# Patient Record
Sex: FEMALE | Race: WHITE | NOT HISPANIC OR LATINO | URBAN - METROPOLITAN AREA
[De-identification: names, ages, dates, MRNs, and addresses within clinical notes are randomized per-mention and may not be internally consistent; named-entity substitution may affect disease eponyms.]

---

## 2018-11-07 ENCOUNTER — EMERGENCY (EMERGENCY)
Facility: HOSPITAL | Age: 58
LOS: 1 days | Discharge: HOME | End: 2018-11-07
Attending: EMERGENCY MEDICINE
Payer: COMMERCIAL

## 2018-11-07 VITALS
HEIGHT: 65.5 IN | HEART RATE: 117 BPM | DIASTOLIC BLOOD PRESSURE: 100 MMHG | TEMPERATURE: 98 F | WEIGHT: 164.91 LBS | SYSTOLIC BLOOD PRESSURE: 186 MMHG | RESPIRATION RATE: 26 BRPM | OXYGEN SATURATION: 100 %

## 2018-11-07 VITALS
OXYGEN SATURATION: 100 % | SYSTOLIC BLOOD PRESSURE: 169 MMHG | HEART RATE: 91 BPM | DIASTOLIC BLOOD PRESSURE: 102 MMHG | RESPIRATION RATE: 18 BRPM

## 2018-11-07 LAB
ALBUMIN SERPL ELPH-MCNC: 4.6 G/DL — SIGNIFICANT CHANGE UP (ref 3.3–5)
ALP SERPL-CCNC: 45 U/L — SIGNIFICANT CHANGE UP (ref 40–120)
ALT FLD-CCNC: 22 U/L — SIGNIFICANT CHANGE UP (ref 10–45)
ANION GAP SERPL CALC-SCNC: 15 MMOL/L — SIGNIFICANT CHANGE UP (ref 5–17)
ANION GAP SERPL CALC-SCNC: 21 MMOL/L — HIGH (ref 5–17)
APPEARANCE UR: CLEAR — SIGNIFICANT CHANGE UP
AST SERPL-CCNC: 32 U/L — SIGNIFICANT CHANGE UP (ref 10–40)
BACTERIA # UR AUTO: NEGATIVE — SIGNIFICANT CHANGE UP
BASOPHILS # BLD AUTO: 0 K/UL — SIGNIFICANT CHANGE UP (ref 0–0.2)
BASOPHILS NFR BLD AUTO: 0.2 % — SIGNIFICANT CHANGE UP (ref 0–2)
BILIRUB SERPL-MCNC: 0.7 MG/DL — SIGNIFICANT CHANGE UP (ref 0.2–1.2)
BILIRUB UR-MCNC: NEGATIVE — SIGNIFICANT CHANGE UP
BUN SERPL-MCNC: 16 MG/DL — SIGNIFICANT CHANGE UP (ref 7–23)
BUN SERPL-MCNC: 19 MG/DL — SIGNIFICANT CHANGE UP (ref 7–23)
CALCIUM SERPL-MCNC: 10.1 MG/DL — SIGNIFICANT CHANGE UP (ref 8.4–10.5)
CALCIUM SERPL-MCNC: 10.9 MG/DL — HIGH (ref 8.4–10.5)
CHLORIDE SERPL-SCNC: 104 MMOL/L — SIGNIFICANT CHANGE UP (ref 96–108)
CHLORIDE SERPL-SCNC: 96 MMOL/L — SIGNIFICANT CHANGE UP (ref 96–108)
CO2 SERPL-SCNC: 18 MMOL/L — LOW (ref 22–31)
CO2 SERPL-SCNC: 20 MMOL/L — LOW (ref 22–31)
COLOR SPEC: SIGNIFICANT CHANGE UP
CREAT SERPL-MCNC: 0.84 MG/DL — SIGNIFICANT CHANGE UP (ref 0.5–1.3)
CREAT SERPL-MCNC: 0.84 MG/DL — SIGNIFICANT CHANGE UP (ref 0.5–1.3)
DIFF PNL FLD: NEGATIVE — SIGNIFICANT CHANGE UP
EOSINOPHIL # BLD AUTO: 0 K/UL — SIGNIFICANT CHANGE UP (ref 0–0.5)
EOSINOPHIL NFR BLD AUTO: 0.5 % — SIGNIFICANT CHANGE UP (ref 0–6)
EPI CELLS # UR: 0 /HPF — SIGNIFICANT CHANGE UP
GAS PNL BLDV: SIGNIFICANT CHANGE UP
GLUCOSE SERPL-MCNC: 114 MG/DL — HIGH (ref 70–99)
GLUCOSE SERPL-MCNC: 135 MG/DL — HIGH (ref 70–99)
GLUCOSE UR QL: NEGATIVE — SIGNIFICANT CHANGE UP
HCT VFR BLD CALC: 39.2 % — SIGNIFICANT CHANGE UP (ref 34.5–45)
HGB BLD-MCNC: 13.8 G/DL — SIGNIFICANT CHANGE UP (ref 11.5–15.5)
HYALINE CASTS # UR AUTO: 0 /LPF — SIGNIFICANT CHANGE UP (ref 0–2)
KETONES UR-MCNC: ABNORMAL
LEUKOCYTE ESTERASE UR-ACNC: NEGATIVE — SIGNIFICANT CHANGE UP
LYMPHOCYTES # BLD AUTO: 1.1 K/UL — SIGNIFICANT CHANGE UP (ref 1–3.3)
LYMPHOCYTES # BLD AUTO: 14.2 % — SIGNIFICANT CHANGE UP (ref 13–44)
MCHC RBC-ENTMCNC: 31.3 PG — SIGNIFICANT CHANGE UP (ref 27–34)
MCHC RBC-ENTMCNC: 35 GM/DL — SIGNIFICANT CHANGE UP (ref 32–36)
MCV RBC AUTO: 89.3 FL — SIGNIFICANT CHANGE UP (ref 80–100)
MONOCYTES # BLD AUTO: 0.4 K/UL — SIGNIFICANT CHANGE UP (ref 0–0.9)
MONOCYTES NFR BLD AUTO: 5.1 % — SIGNIFICANT CHANGE UP (ref 2–14)
NEUTROPHILS # BLD AUTO: 6.4 K/UL — SIGNIFICANT CHANGE UP (ref 1.8–7.4)
NEUTROPHILS NFR BLD AUTO: 80.1 % — HIGH (ref 43–77)
NITRITE UR-MCNC: NEGATIVE — SIGNIFICANT CHANGE UP
PH UR: 6.5 — SIGNIFICANT CHANGE UP (ref 5–8)
PLATELET # BLD AUTO: 245 K/UL — SIGNIFICANT CHANGE UP (ref 150–400)
POTASSIUM SERPL-MCNC: 3.6 MMOL/L — SIGNIFICANT CHANGE UP (ref 3.5–5.3)
POTASSIUM SERPL-MCNC: 3.7 MMOL/L — SIGNIFICANT CHANGE UP (ref 3.5–5.3)
POTASSIUM SERPL-SCNC: 3.6 MMOL/L — SIGNIFICANT CHANGE UP (ref 3.5–5.3)
POTASSIUM SERPL-SCNC: 3.7 MMOL/L — SIGNIFICANT CHANGE UP (ref 3.5–5.3)
PROT SERPL-MCNC: 7.4 G/DL — SIGNIFICANT CHANGE UP (ref 6–8.3)
PROT UR-MCNC: SIGNIFICANT CHANGE UP
RBC # BLD: 4.39 M/UL — SIGNIFICANT CHANGE UP (ref 3.8–5.2)
RBC # FLD: 11.6 % — SIGNIFICANT CHANGE UP (ref 10.3–14.5)
RBC CASTS # UR COMP ASSIST: 1 /HPF — SIGNIFICANT CHANGE UP (ref 0–4)
SODIUM SERPL-SCNC: 135 MMOL/L — SIGNIFICANT CHANGE UP (ref 135–145)
SODIUM SERPL-SCNC: 139 MMOL/L — SIGNIFICANT CHANGE UP (ref 135–145)
SP GR SPEC: 1.02 — SIGNIFICANT CHANGE UP (ref 1.01–1.02)
TROPONIN T, HIGH SENSITIVITY RESULT: 8 NG/L — SIGNIFICANT CHANGE UP (ref 0–51)
TROPONIN T, HIGH SENSITIVITY RESULT: <6 NG/L — SIGNIFICANT CHANGE UP (ref 0–51)
TSH SERPL-MCNC: 2.7 UIU/ML — SIGNIFICANT CHANGE UP (ref 0.27–4.2)
UROBILINOGEN FLD QL: NEGATIVE — SIGNIFICANT CHANGE UP
WBC # BLD: 8 K/UL — SIGNIFICANT CHANGE UP (ref 3.8–10.5)
WBC # FLD AUTO: 8 K/UL — SIGNIFICANT CHANGE UP (ref 3.8–10.5)
WBC UR QL: 0 /HPF — SIGNIFICANT CHANGE UP (ref 0–5)

## 2018-11-07 PROCEDURE — 99285 EMERGENCY DEPT VISIT HI MDM: CPT | Mod: 25

## 2018-11-07 PROCEDURE — 82330 ASSAY OF CALCIUM: CPT

## 2018-11-07 PROCEDURE — 84484 ASSAY OF TROPONIN QUANT: CPT

## 2018-11-07 PROCEDURE — 84132 ASSAY OF SERUM POTASSIUM: CPT

## 2018-11-07 PROCEDURE — 85014 HEMATOCRIT: CPT

## 2018-11-07 PROCEDURE — 82550 ASSAY OF CK (CPK): CPT

## 2018-11-07 PROCEDURE — 85027 COMPLETE CBC AUTOMATED: CPT

## 2018-11-07 PROCEDURE — 83605 ASSAY OF LACTIC ACID: CPT

## 2018-11-07 PROCEDURE — 71046 X-RAY EXAM CHEST 2 VIEWS: CPT

## 2018-11-07 PROCEDURE — 80048 BASIC METABOLIC PNL TOTAL CA: CPT

## 2018-11-07 PROCEDURE — 93005 ELECTROCARDIOGRAM TRACING: CPT

## 2018-11-07 PROCEDURE — 84295 ASSAY OF SERUM SODIUM: CPT

## 2018-11-07 PROCEDURE — 71046 X-RAY EXAM CHEST 2 VIEWS: CPT | Mod: 26

## 2018-11-07 PROCEDURE — 99284 EMERGENCY DEPT VISIT MOD MDM: CPT | Mod: 25

## 2018-11-07 PROCEDURE — 36415 COLL VENOUS BLD VENIPUNCTURE: CPT

## 2018-11-07 PROCEDURE — 84443 ASSAY THYROID STIM HORMONE: CPT

## 2018-11-07 PROCEDURE — 81001 URINALYSIS AUTO W/SCOPE: CPT

## 2018-11-07 PROCEDURE — 82803 BLOOD GASES ANY COMBINATION: CPT

## 2018-11-07 PROCEDURE — 82435 ASSAY OF BLOOD CHLORIDE: CPT

## 2018-11-07 PROCEDURE — 82962 GLUCOSE BLOOD TEST: CPT

## 2018-11-07 PROCEDURE — 80053 COMPREHEN METABOLIC PANEL: CPT

## 2018-11-07 PROCEDURE — 82947 ASSAY GLUCOSE BLOOD QUANT: CPT

## 2018-11-07 PROCEDURE — 93010 ELECTROCARDIOGRAM REPORT: CPT

## 2018-11-07 RX ORDER — SODIUM CHLORIDE 9 MG/ML
1000 INJECTION INTRAMUSCULAR; INTRAVENOUS; SUBCUTANEOUS ONCE
Qty: 0 | Refills: 0 | Status: COMPLETED | OUTPATIENT
Start: 2018-11-07 | End: 2018-11-07

## 2018-11-07 RX ADMIN — SODIUM CHLORIDE 1000 MILLILITER(S): 9 INJECTION INTRAMUSCULAR; INTRAVENOUS; SUBCUTANEOUS at 17:34

## 2018-11-07 RX ADMIN — Medication 0.5 MILLIGRAM(S): at 19:16

## 2018-11-07 RX ADMIN — SODIUM CHLORIDE 1000 MILLILITER(S): 9 INJECTION INTRAMUSCULAR; INTRAVENOUS; SUBCUTANEOUS at 16:20

## 2018-11-07 NOTE — ED PROVIDER NOTE - PROGRESS NOTE DETAILS
Took signout on patient, well appearing, 2nd trop 8 so repeated 3rd time, also 8. Discharging pt to f/u with her PMD. Do not suspect emergent pathology at this time.

## 2018-11-07 NOTE — ED PROVIDER NOTE - NEUROLOGICAL, MLM
Patient called stating that she was suppose to use the Biest daily per [facility] but when patient uses the medication daily her breasts are very sore.  Patient uses the Biest every other day and then occasionally she may need to skip 2 days because her breasts are sore.  Patient states there is no blood in her urine and her urethra is not sore anymore.  Patient is wondering if it is okay the way she is using the medication.     Alert and oriented, no focal deficits, no motor or sensory deficits.

## 2018-11-07 NOTE — ED ADULT NURSE REASSESSMENT NOTE - NS ED NURSE REASSESS COMMENT FT1
Pt refusing blood work at this time. Family verbalizes concern of not having enough attention from doctors. Expresses desire to leave AMA. Dr Swift made aware. Awaiting further orders before proceeding. Pt refusing blood work at this time. Family verbalizes concern of not having enough attention from doctors and PAs. Expresses desire to leave AMA. TIO Burk made aware. Awaiting further orders before proceeding.

## 2018-11-07 NOTE — ED ADULT NURSE NOTE - NSIMPLEMENTINTERV_GEN_ALL_ED
Implemented All Universal Safety Interventions:  [place] to call system. Call bell, personal items and telephone within reach. Instruct patient to call for assistance. Room bathroom lighting operational. Non-slip footwear when patient is off stretcher. Physically safe environment: no spills, clutter or unnecessary equipment. Stretcher in lowest position, wheels locked, appropriate side rails in place.

## 2018-11-07 NOTE — ED PROVIDER NOTE - ATTENDING CONTRIBUTION TO CARE
57 yo F presents with episode of lightheadedness, shakiness, tingling in her bl hands and diaphoresis that started while she was sitting in the car. she got extremely anxious afterwards and started having palpitations and hyperventilating. she ate breakfast at 8am in the morning, and then worked out around 11am. 30 minutes after working out she sat in her car and had this episode. felt like she was hypoglycemic, so she took two bites of her sandwich with improvement of her symptoms. she drove home and then symptoms recurred slightly. so she drank a bottle of gatorade. she felt better after.   at this time patient feels much better. mild lightheadedness on sitting/standing up.   pt states that usually eats right before working out, and today she ate much earlier.   no headache, f/ch, uri symptoms, cp, sob, abd pain, N/V/D, urinary complaints, weakness/numbness  PE extremely anxious. AOX3, neuro intact, lungs CTA. abd soft and NT 59 yo F presents with episode of lightheadedness, shakiness, tingling in her bl hands and diaphoresis that started while she was sitting in the car. she got extremely anxious afterwards and started having palpitations and hyperventilating, and has been extremely anxious and hyperventilating ever since then (~2 hours). feels like she is having a panic attack. she ate breakfast at 8am in the morning, and then worked out around 11am. 30 minutes after working out she sat in her car and had this episode. felt like she was hypoglycemic, so she took two bites of her sandwich with improvement of her symptoms. she drove home and then symptoms recurred slightly. so she drank a bottle of gatorade. she felt better after.   at this time patient feels much better. mild lightheadedness on sitting/standing up.   pt states that usually eats right before working out, and today she ate much earlier.   no headache, f/ch, uri symptoms, cp, sob, abd pain, N/V/D, urinary complaints, weakness/numbness  PE extremely anxious. speaking and breathing rapidly. AOX3, neuro intact, lungs CTA. abd soft and NT. neuro intact.     patient verbally de-escalated on arrival. instructed to slow her breathing.   CXR with NAD. EKG with no acute changes. initial labs demonstrating a resp alkalosis, with pH 7.63. elevated lactate 4.4.   patient given IVFs int eh ER and allowed to relax. she eventually calmed down. repeat pH improved 7.58. lactate cleared. initial trop negative, --> repeat 8 --> delta trop 8, negative.   on patient re-eval she reported resolution of ALL her symptoms. she stated that she felt at baseline. denied any feelings of lightheadedness and tinglign. denies cp/sob. VS stable. patient has no PE risk factors, PERC negative. no e/o acs.   I suspect that initial symptoms related to hypoglycemic attack (since patient worked out several hours after eating) and her symptoms improved prior to arrival after eating/drinking. subsequent symptoms likely anxiety attack, which has resolved at this time. Patient was discharged with instructions to drink fluids, eat on a regular basis, and follow up with PMD in 1-2 days for repeat evaluation and further management.    The patient was discharged from the ED in stable condition. All results of today's workup were discussed with the patient and all questions/concerns were addressed. All discharge instructions were thoroughly discussed with the patient, as well as important warning signs and new/ worsening symptoms which should necessitate patient's immediate return to the ED. The patient is agreeable with discharge and expresses full understanding of all instructions given.

## 2018-11-07 NOTE — ED PROVIDER NOTE - CARE PLAN
Principal Discharge DX:	Anxiety Principal Discharge DX:	Lightheadedness  Secondary Diagnosis:	Anxiety  Secondary Diagnosis:	Alkalosis

## 2018-11-07 NOTE — ED PROVIDER NOTE - OBJECTIVE STATEMENT
59yo F with PMH hypothyroidism presenting with hyperventilation with assocated lightheadedness, HA, shaking, and tingling to b/l hands and feet x 2.5 hours. Pt  reports symptoms began after going to the gym today. Reports she woke up feeling well, ate breakfast, worked out with weights/cardio as she typically does, ate after gym, and symptoms began while in car approx 20 mins after the gym. Reports symptoms have persisted and only started feeling better in ED room. Pt reports HA has now resolved.  Never felt like this before. Of note, pt reports she "drinks too much" and admits to drinking "several" glasses of wine every night, and has been doing so for many years. Last drank wine last night. Has not tried to stop drinking before but would like to now. Denies any new meds/supplements/pre-workout, recent travel, hormone use, LE pain/swelling, fever/chills, recent illness, cough, CP, palpitations, abd pain, n/v, vision changes, weakness. 57yo F with PMH hypothyroidism presenting with hyperventilation with assocated lightheadedness, HA, body shaking, and tingling to b/l hands and feet x 2.5 hours. Pt  reports symptoms began after going to the gym today and have persisted since. Reports she woke up feeling well, ate breakfast, worked out with weights/cardio as she typically does, ate after gym, and symptoms began while in car approx 20 mins after the gym. Reports symptoms have persisted and only started feeling better in ED room. Pt reports HA has now resolved.  Never felt like this before. Of note, pt reports she "drinks too much" and admits to drinking "several" glasses of wine every night, and has been doing so for many years. Last drank wine last night. Has not tried to stop drinking before but would like to now. Denies any new meds/supplements/pre-workout, recent travel, hormone use, LE pain/swelling, fever/chills, recent illness, cough, CP, palpitations, abd pain, n/v/d, vision changes, weakness, urinary frequency/urgency, dysuria, h/o DM. 57yo F with PMH hypothyroidism presenting with hyperventilation with assocated lightheadedness, HA, body shaking, and tingling to b/l hands and feet x 2.5 hours. Pt  reports symptoms began after going to the gym today and have persisted since. Reports she woke up feeling well, ate breakfast, worked out with weights/cardio as she typically does, ate after gym, and symptoms began while in car approx 20 mins after the gym. Reports symptoms have persisted and only started feeling better in ED room. Pt reports HA has now resolved.  Never felt like this before. Of note, pt reports she "drinks too much" and admits to drinking "several" glasses of wine every night, and has been doing so for many years. Last drank wine last night. Has not tried to stop drinking before but would like to now. Former smoker. Denies any new meds/supplements/pre-workout, recent travel, hormone use, LE pain/swelling, fever/chills, recent illness, cough, CP, palpitations, abd pain, n/v/d, vision changes, weakness, urinary frequency/urgency, dysuria, h/o DM. 59yo F with PMH hypothyroidism presenting with hyperventilation with assocated lightheadedness, HA, body shaking, and tingling to b/l hands and feet x 2.5 hours. Pt  reports symptoms began after going to the gym today and have persisted since. Reports she woke up feeling well, ate breakfast, worked out with weights/cardio as she typically does, ate after gym, and symptoms began while in car approx 20 mins after the gym. Reports symptoms have persisted and only started feeling better in ED room. Pt reports HA has now resolved.  Never felt like this before. Of note, pt reports she "drinks too much" and admits to drinking "several" glasses of wine every night, and has been doing so for many years. Last drank wine last night. Has not tried to stop drinking before but would like to now. Former smoker. Denies any new meds/supplements/pre-workout, recent travel, hormone use, LE pain/swelling, fever/chills, recent illness, cough, CP, palpitations, abd pain, n/v/d, vision changes, weakness, urinary frequency/urgency, dysuria, h/o panic attacks, h/o DM. 59yo F with PMH hypothyroidism presenting with hyperventilation with assocated lightheadedness, HA, body shaking, and tingling to b/l hands and feet x 2.5 hours. Pt  reports symptoms began after going to the gym today and have persisted since. Reports she woke up feeling well, ate breakfast, worked out with weights/cardio as she typically does, and symptoms began while sitting in car approx 20 mins after the gym. Reports symptoms have persisted and only started feeling better in ED room. Pt reports HA has now resolved.  Never felt like this before. Of note, pt reports she "drinks too much" and admits to drinking "several" glasses of wine every night, and has been doing so for many years. Last drank wine last night. Has not tried to stop drinking before but would like to now. Former smoker. Denies any new meds/supplements/pre-workout, recent travel, hormone use, LE pain/swelling, fever/chills, recent illness, cough, CP, palpitations, abd pain, n/v/d, vision changes, weakness, urinary frequency/urgency, dysuria, h/o panic attacks, h/o DM.

## 2018-11-07 NOTE — ED ADULT NURSE NOTE - OBJECTIVE STATEMENT
59 y/o F pt w/ pmh of hypothyroidism, present to ED for lightheaded, HA, tingling to b/l hands and feet s/p hyperventilating, symptoms started 2 and half hour ago s/p working at the gym, pt was in her car when she started hyperventilating, became diaphoretic, lightheaded, tingling to b/l hands and feet, on exam pt A&Ox3, PERRL, neg facial droop, neg slurred speech, neg arm drift, equal b/l extremity strength, no vision changes, pt hypertensive 160's/100's, tachypneic rr 30's, denies weakness, numbness, tingling, N/V/D, chest pain, SOB, seen and eval by veronica MALONE placed, labs drawn and sent, EKG done and given to MD, CHICA NSR

## 2018-11-12 ENCOUNTER — LABORATORY RESULT (OUTPATIENT)
Age: 58
End: 2018-11-12

## 2018-11-13 ENCOUNTER — APPOINTMENT (OUTPATIENT)
Dept: CARDIOLOGY | Facility: CLINIC | Age: 58
End: 2018-11-13
Payer: COMMERCIAL

## 2018-11-13 ENCOUNTER — NON-APPOINTMENT (OUTPATIENT)
Age: 58
End: 2018-11-13

## 2018-11-13 ENCOUNTER — TRANSCRIPTION ENCOUNTER (OUTPATIENT)
Age: 58
End: 2018-11-13

## 2018-11-13 VITALS
HEART RATE: 74 BPM | SYSTOLIC BLOOD PRESSURE: 135 MMHG | RESPIRATION RATE: 15 BRPM | HEIGHT: 65 IN | DIASTOLIC BLOOD PRESSURE: 85 MMHG | WEIGHT: 165 LBS | BODY MASS INDEX: 27.49 KG/M2

## 2018-11-13 DIAGNOSIS — Z87.891 PERSONAL HISTORY OF NICOTINE DEPENDENCE: ICD-10-CM

## 2018-11-13 DIAGNOSIS — Z82.49 FAMILY HISTORY OF ISCHEMIC HEART DISEASE AND OTHER DISEASES OF THE CIRCULATORY SYSTEM: ICD-10-CM

## 2018-11-13 DIAGNOSIS — J06.9 ACUTE UPPER RESPIRATORY INFECTION, UNSPECIFIED: ICD-10-CM

## 2018-11-13 PROCEDURE — 93306 TTE W/DOPPLER COMPLETE: CPT

## 2018-11-13 PROCEDURE — 93000 ELECTROCARDIOGRAM COMPLETE: CPT | Mod: 59

## 2018-11-13 PROCEDURE — 99242 OFF/OP CONSLTJ NEW/EST SF 20: CPT | Mod: 25

## 2018-11-13 PROCEDURE — 93015 CV STRESS TEST SUPVJ I&R: CPT

## 2018-11-13 RX ORDER — LEVOTHYROXINE SODIUM 50 UG/1
50 TABLET ORAL DAILY
Refills: 0 | Status: ACTIVE | COMMUNITY
Start: 2018-11-13

## 2018-11-21 NOTE — PHYSICAL EXAM
[General Appearance - Well Developed] : well developed [Normal Appearance] : normal appearance [Well Groomed] : well groomed [General Appearance - Well Nourished] : well nourished [No Deformities] : no deformities [General Appearance - In No Acute Distress] : no acute distress [Normal Conjunctiva] : the conjunctiva exhibited no abnormalities [Normal Oral Mucosa] : normal oral mucosa [Normal Jugular Venous A Waves Present] : normal jugular venous A waves present [Normal Jugular Venous V Waves Present] : normal jugular venous V waves present [No Jugular Venous Estrada A Waves] : no jugular venous estrada A waves [5th Left ICS - MCL] : palpated at the 5th LICS in the midclavicular line [Normal] : normal [No Precordial Heave] : no precordial heave was noted [Normal Rate] : normal [Rhythm Regular] : regular [Normal S1] : normal S1 [Normal S2] : normal S2 [No Gallop] : no gallop heard [I] : a grade 1 [2+] : left 2+ [No Abnormalities] : the abdominal aorta was not enlarged and no bruit was heard [No Pitting Edema] : no pitting edema present [Respiration, Rhythm And Depth] : normal respiratory rhythm and effort [Exaggerated Use Of Accessory Muscles For Inspiration] : no accessory muscle use [Bowel Sounds] : normal bowel sounds [Abdomen Soft] : soft [Abnormal Walk] : normal gait [Gait - Sufficient For Exercise Testing] : the gait was sufficient for exercise testing [Nail Clubbing] : no clubbing of the fingernails [Cyanosis, Localized] : no localized cyanosis [Skin Color & Pigmentation] : normal skin color and pigmentation [Skin Turgor] : normal skin turgor [] : no rash [Oriented To Time, Place, And Person] : oriented to person, place, and time [Affect] : the affect was normal [Mood] : the mood was normal [No Anxiety] : not feeling anxious [S3] : no S3 [S4] : no S4 [Click] : no click [Distant] : the heart sounds were ~L not distant [Pericardial Rub] : no pericardial rub [Right Carotid Bruit] : no bruit heard over the right carotid [Left Carotid Bruit] : no bruit heard over the left carotid [Right Femoral Bruit] : no bruit heard over the right femoral artery [Left Femoral Bruit] : no bruit heard over the left femoral artery [FreeTextEntry1] : coarse breath sounds bilaterally

## 2018-11-21 NOTE — DISCUSSION/SUMMARY
[FreeTextEntry1] : This is a 58-year-old female past medical history significant for hypothyroidism, who comes in for cardiac consultation. The patient was well until approximately one week ago when after a vigorous workout with her , she developed a rapid heart rate, didn't feel well, felt particularly hungry. She went to get something to eat as she left the gym, and then sat in the car and felt that she was diaphoretic and shaky. She drank some Gatorade without improvement. She noted that her heart rate was running between  beats per minute on her watch. The patient had completed a vigorous workout on the elliptical  and heart rate went up to 160 beats per minute at that time. She then went to an urgent care center who did not do an electrocardiogram at that time but center to [facility] emergency room. Her blood pressure was noted to be 170/100+, and she was sent out for outpatient followup evaluation. The patient also gets occasional dyspnea on exertion.She was placed on Norvasc 5 mg a day by her primary care physician.\par Her cardiac risk factors include positive family support disease her mother had a myocardial infarction and subsequent coronary artery bypass surgery, her father  from myocardial infarction, and a brother recently  within the last 6 months but was diagnosed with atherosclerotic heart disease which was mild.\par Her cardiac risk factors include positive family history, history of smoking (she stopped over 30 years ago), and hypertension.\par New blood work will be done today for a lipid profile.\par The patient had a normal exercise stress test 2018. She had a mild elevated blood pressure response to exercise with peak blood pressure 175/90. She will also have an echo Doppler examination to rule out mitral valve prolapse, evaluate her left ventricular function, chamber size, and rule out hypertrophy.\par Electrocardiogram done 2018 demonstrated normal sinus rhythm at a rate of 74 beats per minute and is otherwise remarkable for poor R-wave progression.\par The patient's blood pressure is still above the normal acceptable limits, and I would recommend switching her Norvasc to Micardis 40 mg a day with an eye toward increasing that to 80 mg daily for better blood pressure control.\par The patient also has coarse breath sounds bilaterally without wheezing. She does lin has a productive cough. She will start on a Z-Ashish and will follow up with her primary care physician.\par Lifestyle modification including salt restriction was reinforced. The patient does exercise on regular basis.\par She is instructed to followup with her primary care physician. She'll followup with me in one month.\par I instructed her to increase her hydration during her aerobic workouts.  Her heart rate was most likely secondary to the effects of hypertension.\par She will followup with me in one month.\par

## 2018-11-21 NOTE — REASON FOR VISIT
[Consultation] : a consultation regarding [Hypertension] : hypertension [Palpitations] : palpitations [FreeTextEntry1] : murmur,

## 2018-12-10 ENCOUNTER — LABORATORY RESULT (OUTPATIENT)
Age: 58
End: 2018-12-10

## 2018-12-11 ENCOUNTER — APPOINTMENT (OUTPATIENT)
Dept: CARDIOLOGY | Facility: CLINIC | Age: 58
End: 2018-12-11
Payer: COMMERCIAL

## 2018-12-11 VITALS
HEART RATE: 66 BPM | RESPIRATION RATE: 15 BRPM | DIASTOLIC BLOOD PRESSURE: 78 MMHG | HEIGHT: 65 IN | BODY MASS INDEX: 25.99 KG/M2 | SYSTOLIC BLOOD PRESSURE: 123 MMHG | WEIGHT: 156 LBS

## 2018-12-11 PROCEDURE — 99213 OFFICE O/P EST LOW 20 MIN: CPT

## 2018-12-11 NOTE — DISCUSSION/SUMMARY
[FreeTextEntry1] : This is a 58-year-old female past medical history significant for hypothyroidism, who comes in for cardiac follow up cardiac evaluation.\par The patient's blood pressure is under excellent control. She is also changed her diet, she has reduced his salt intake. She will continue on the current medications. She will have blood work today to check a lipid panel.\par Further recommendations made at the results of the lipid are available for review. I will also review the results of her echo Doppler examination from 2018 which demonstrated minimal valvular regurgitation with satisfactory left ventricular function. She will followup with me in 4 months.\par The patient understands that aerobic exercises must be increased to 40 minutes 4 times per week. A detailed discussion of lifestyle modification was done today. The patient has a good understanding of the diagnosis, and treatment plan. Lifestyle modification was also outlined.\par \par \par \par The patient was well until approximately 2018-  when after a vigorous workout with her , she developed a rapid heart rate, didn't feel well, felt particularly hungry. She went to get something to eat as she left the gym, and then sat in the car and felt that she was diaphoretic and shaky. She drank some Gatorade without improvement. She noted that her heart rate was running between  beats per minute on her watch. The patient had completed a vigorous workout on the elliptical  and heart rate went up to 160 beats per minute at that time. She then went to an urgent care center who did not do an electrocardiogram at that time but center to [facility] emergency room. Her blood pressure was noted to be 170/100+, and she was sent out for outpatient followup evaluation. The patient also gets occasional dyspnea on exertion.She was placed on Norvasc 5 mg a day by her primary care physician.\par Her cardiac risk factors include positive family support disease her mother had a myocardial infarction and subsequent coronary artery bypass surgery, her father  from myocardial infarction, and a brother recently  within the last 6 months but was diagnosed with atherosclerotic heart disease which was mild.\par Her cardiac risk factors include positive family history, history of smoking (she stopped over 30 years ago), and hypertension.\par \par The patient had a normal exercise stress test 2018. She had a mild elevated blood pressure response to exercise with peak blood pressure 175/90. She will also have an echo Doppler examination to rule out mitral valve prolapse, evaluate her left ventricular function, chamber size, and rule out hypertrophy.\par Electrocardiogram done 2018 demonstrated normal sinus rhythm at a rate of 74 beats per minute and is otherwise remarkable for poor R-wave progression.\par Lifestyle modification including salt restriction was reinforced. The patient does exercise on regular basis.\par She is instructed to followup with her primary care physician. She'll followup with me in one month.\par I instructed her to increase her hydration during her aerobic workouts.  Her heart rate was most likely secondary to the effects of hypertension.\par She will followup with me in one month.\par

## 2018-12-11 NOTE — PHYSICAL EXAM
[FreeTextEntry1] : coarse breath sounds bilaterally [S3] : no S3 [S4] : no S4 [Click] : no click [Pericardial Rub] : no pericardial rub [Right Carotid Bruit] : no bruit heard over the right carotid [Left Carotid Bruit] : no bruit heard over the left carotid [Right Femoral Bruit] : no bruit heard over the right femoral artery [Left Femoral Bruit] : no bruit heard over the left femoral artery

## 2019-01-04 ENCOUNTER — RX RENEWAL (OUTPATIENT)
Age: 59
End: 2019-01-04

## 2019-01-08 ENCOUNTER — RX RENEWAL (OUTPATIENT)
Age: 59
End: 2019-01-08

## 2019-01-09 ENCOUNTER — TRANSCRIPTION ENCOUNTER (OUTPATIENT)
Age: 59
End: 2019-01-09

## 2019-01-10 ENCOUNTER — RX RENEWAL (OUTPATIENT)
Age: 59
End: 2019-01-10

## 2019-01-29 ENCOUNTER — TRANSCRIPTION ENCOUNTER (OUTPATIENT)
Age: 59
End: 2019-01-29

## 2019-01-29 ENCOUNTER — CLINICAL ADVICE (OUTPATIENT)
Age: 59
End: 2019-01-29

## 2019-03-06 ENCOUNTER — TRANSCRIPTION ENCOUNTER (OUTPATIENT)
Age: 59
End: 2019-03-06

## 2019-03-19 ENCOUNTER — NON-APPOINTMENT (OUTPATIENT)
Age: 59
End: 2019-03-19

## 2019-03-19 ENCOUNTER — APPOINTMENT (OUTPATIENT)
Dept: CARDIOLOGY | Facility: CLINIC | Age: 59
End: 2019-03-19
Payer: COMMERCIAL

## 2019-03-19 VITALS
RESPIRATION RATE: 15 BRPM | HEART RATE: 64 BPM | DIASTOLIC BLOOD PRESSURE: 79 MMHG | BODY MASS INDEX: 25.39 KG/M2 | WEIGHT: 158 LBS | SYSTOLIC BLOOD PRESSURE: 125 MMHG | HEIGHT: 66 IN

## 2019-03-19 PROCEDURE — 93000 ELECTROCARDIOGRAM COMPLETE: CPT

## 2019-03-19 PROCEDURE — 99213 OFFICE O/P EST LOW 20 MIN: CPT

## 2019-03-26 ENCOUNTER — LABORATORY RESULT (OUTPATIENT)
Age: 59
End: 2019-03-26

## 2019-04-02 ENCOUNTER — TRANSCRIPTION ENCOUNTER (OUTPATIENT)
Age: 59
End: 2019-04-02

## 2019-12-04 ENCOUNTER — RX RENEWAL (OUTPATIENT)
Age: 59
End: 2019-12-04

## 2020-02-04 ENCOUNTER — TRANSCRIPTION ENCOUNTER (OUTPATIENT)
Age: 60
End: 2020-02-04

## 2020-02-25 ENCOUNTER — NON-APPOINTMENT (OUTPATIENT)
Age: 60
End: 2020-02-25

## 2020-02-25 ENCOUNTER — APPOINTMENT (OUTPATIENT)
Dept: CARDIOLOGY | Facility: CLINIC | Age: 60
End: 2020-02-25
Payer: COMMERCIAL

## 2020-02-25 VITALS
SYSTOLIC BLOOD PRESSURE: 115 MMHG | HEIGHT: 66 IN | HEART RATE: 56 BPM | BODY MASS INDEX: 24.91 KG/M2 | WEIGHT: 155 LBS | DIASTOLIC BLOOD PRESSURE: 75 MMHG | RESPIRATION RATE: 15 BRPM

## 2020-02-25 DIAGNOSIS — Z87.891 PERSONAL HISTORY OF NICOTINE DEPENDENCE: ICD-10-CM

## 2020-02-25 PROCEDURE — 99214 OFFICE O/P EST MOD 30 MIN: CPT

## 2020-02-25 RX ORDER — AMLODIPINE BESYLATE 5 MG/1
5 TABLET ORAL
Refills: 0 | Status: DISCONTINUED | COMMUNITY
Start: 2018-11-13 | End: 2019-03-01

## 2020-02-26 RX ORDER — AZITHROMYCIN 250 MG/1
250 TABLET, FILM COATED ORAL
Qty: 6 | Refills: 0 | Status: COMPLETED | COMMUNITY
Start: 2018-11-13 | End: 2020-02-26

## 2020-04-25 ENCOUNTER — MESSAGE (OUTPATIENT)
Age: 60
End: 2020-04-25

## 2020-06-17 LAB
SARS-COV-2 IGG SERPL IA-ACNC: <3.8 AU/ML
SARS-COV-2 IGG SERPL QL IA: NEGATIVE

## 2020-12-16 PROBLEM — J06.9 UPPER RESPIRATORY INFECTION, ACUTE: Status: RESOLVED | Noted: 2018-11-13 | Resolved: 2020-12-16

## 2021-04-16 NOTE — ED ADULT NURSE NOTE - CAS EDN DISCHARGE ASSESSMENT
Awake/Alert and oriented to person, place and time
Call bell/Explanation of exam/test/Fall precautions/NPO

## 2021-07-11 ENCOUNTER — LABORATORY RESULT (OUTPATIENT)
Age: 61
End: 2021-07-11

## 2021-07-12 ENCOUNTER — NON-APPOINTMENT (OUTPATIENT)
Age: 61
End: 2021-07-12

## 2021-07-12 ENCOUNTER — APPOINTMENT (OUTPATIENT)
Dept: CARDIOLOGY | Facility: CLINIC | Age: 61
End: 2021-07-12
Payer: COMMERCIAL

## 2021-07-12 VITALS
SYSTOLIC BLOOD PRESSURE: 110 MMHG | WEIGHT: 156 LBS | HEART RATE: 82 BPM | BODY MASS INDEX: 25.07 KG/M2 | OXYGEN SATURATION: 97 % | RESPIRATION RATE: 16 BRPM | DIASTOLIC BLOOD PRESSURE: 74 MMHG | HEIGHT: 66 IN | TEMPERATURE: 97.7 F

## 2021-07-12 PROCEDURE — 93000 ELECTROCARDIOGRAM COMPLETE: CPT

## 2021-07-12 PROCEDURE — 99072 ADDL SUPL MATRL&STAF TM PHE: CPT

## 2021-07-12 PROCEDURE — 99214 OFFICE O/P EST MOD 30 MIN: CPT

## 2021-07-12 NOTE — ASSESSMENT
[FreeTextEntry1] : This is a 60 year year old female here today for follow up cardiac evaluation. \par She has a past medical history significant for  hypothyroidism, HTN and HLD.\par \par She is a retired ID nurse.\par \par CHIEF COMPLAINT: Regular follow up appointment.\par \par -Pt is stable from a cardiac standpoint and does not have any complaints at this time. She does admit to occasional LIZAMA when going up some stairs.\par \par -Her cardiac risk factors include positive family support disease her mother had a myocardial infarction and subsequent coronary artery bypass surgery, her father  from myocardial infarction, and a brother recently  within the last 6 months but was diagnosed with atherosclerotic heart disease which was mild. other cardiac risk factors include positive family history, history of smoking (she stopped over 30 years ago), and hypertension.\par \par BLOOD PRESSURE:\par -BP is well controlled in today's visit and patient is on Telmisartan 40mg PO DAILY.\par \par -I have discussed the importance of maintaining good BP control and reviewed the newest guidelines with the patient while re-enforcing dietary sodium restrictions to no more than 2-3 g daily, DASH diet, life style modifications as well as the goal of maintaining ideal body weight with the patient today. I have advised the patient to avoid the use of over-the-counter medications/ supplements especially NSAIDS.\par \par I have reviewed with Ms. FLOYD that serious health consequences can occur when blood pressure is not well controlled and the need for strict compliance with medication and that optimal control can significantly reduce the risk of cardiovascular disease stroke, heart attack and other organ damage. They verbally expressed understanding of the fore mentioned serious health consequences to me today.\par \par BLOOD WORK:\par -New blood work was done today, 2021 to evaluate lipid profile, CBC, BMP, hepatic function, A1C and TSH. Pt currently taking Rosuvastatin 20mg PO DAILY. \par \par CHOLESTEROL CONTROL:\par -Patient will continue the advised  TLC diet and to continue follow-up for treatment of hyperlipidemia and repeat blood testing with diet and exercise. I have discussed different exercises and the importance of maintenance of optimal body weight. The importance of staying within guidelines and recommendations was stressed to the patient today and they acknowledged that they understand this to me verbally.\par \par  -Ms. FLOYD was educated and advised that failure to follow-up with my medical recommendations to lower cholesterol can result in severe health consequences therefore, they will continuing a low saturated and low fat diet and to avoid excessive carbohydrates to help reduce triglycerides and that lowering LDL levels is associated with a significant decrease in serious cardiac events including but not limited to heart attack stroke and overall death. We will continue lipid lowering agents as advised based on blood test results and the patient understands to call if they develop severe muscle discomfort or if they have a reddish tinted discoloration in their urine.\par \par TESTING/REPORTS:\par -EKG done 2021 which demonstrated regular sinus rhythm with nonspecific ST-T wave changes, BPM of 61\par \par -Echo Doppler examination from 2018 which demonstrated minimal valvular regurgitation with satisfactory left ventricular function. She will followup with me in 4 months.\par \par -The patient had a normal exercise stress test 2018. \par \par PLAN:\par -She will continue with her usual medications and will contact the office if she is having any complaints between now and their next follow up appointment.\par -The patient will schedule an Exercise Stress Test rule out significant coronary artery disease and will schedule \par an Echo Doppler examination to evaluate murmur, left ventricular function, chamber size, and rule out hypertrophy.\par \par I have discussed the plan of care with Ms. CASTILLO FLOYD  and she  will follow up in 6 months. She is compliant with all of her medications.\par \par The patient understands that aerobic exercises must be increased to minutes 4 times/week and a detailed discussion of lifestyle modification was done today. \par The patient has a good understanding of the diagnosis, treatment plan and lifestyle modification. \par She will contact me at the office for any questions with their care or any changes in their health status.\par \par The patient was seen together with supervision physician Dr. Max Chase and the plan of care was discussed with the patient and will be carried out as noted above.\par \par Poly BLAND

## 2021-07-12 NOTE — PHYSICAL EXAM
[Well Developed] : well developed [Well Nourished] : well nourished [No Acute Distress] : no acute distress [Normal Venous Pressure] : normal venous pressure [No Carotid Bruit] : no carotid bruit [Normal S1, S2] : normal S1, S2 [No Murmur] : no murmur [No Rub] : no rub [Clear Lung Fields] : clear lung fields [Good Air Entry] : good air entry [No Respiratory Distress] : no respiratory distress  [Soft] : abdomen soft [Non Tender] : non-tender [No Masses/organomegaly] : no masses/organomegaly [Normal Bowel Sounds] : normal bowel sounds [Normal Gait] : normal gait [No Edema] : no edema [No Cyanosis] : no cyanosis [No Clubbing] : no clubbing [No Varicosities] : no varicosities [No Rash] : no rash [No Skin Lesions] : no skin lesions [Moves all extremities] : moves all extremities [No Focal Deficits] : no focal deficits [Normal Speech] : normal speech [Alert and Oriented] : alert and oriented [Normal memory] : normal memory [General Appearance - Well Developed] : well developed [Normal Appearance] : normal appearance [Well Groomed] : well groomed [General Appearance - Well Nourished] : well nourished [No Deformities] : no deformities [General Appearance - In No Acute Distress] : no acute distress [Normal Conjunctiva] : the conjunctiva exhibited no abnormalities [Normal Oral Mucosa] : normal oral mucosa [Normal Jugular Venous A Waves Present] : normal jugular venous A waves present [Normal Jugular Venous V Waves Present] : normal jugular venous V waves present [No Jugular Venous Estrada A Waves] : no jugular venous estrada A waves [Respiration, Rhythm And Depth] : normal respiratory rhythm and effort [Exaggerated Use Of Accessory Muscles For Inspiration] : no accessory muscle use [Bowel Sounds] : normal bowel sounds [Abdomen Soft] : soft [Abnormal Walk] : normal gait [Gait - Sufficient For Exercise Testing] : the gait was sufficient for exercise testing [Nail Clubbing] : no clubbing of the fingernails [Cyanosis, Localized] : no localized cyanosis [Skin Color & Pigmentation] : normal skin color and pigmentation [Skin Turgor] : normal skin turgor [] : no rash [Oriented To Time, Place, And Person] : oriented to person, place, and time [Affect] : the affect was normal [Mood] : the mood was normal [No Anxiety] : not feeling anxious [5th Left ICS - MCL] : palpated at the 5th LICS in the midclavicular line [Normal] : normal [No Precordial Heave] : no precordial heave was noted [Bradycardia] : bradycardic [Heart Rate ___] : [unfilled] bpm [Rhythm Regular] : regular [Normal S1] : normal S1 [Normal S2] : normal S2 [No Gallop] : no gallop heard [I] : a grade 1 [2+] : left 2+ [No Abnormalities] : the abdominal aorta was not enlarged and no bruit was heard [No Pitting Edema] : no pitting edema present [FreeTextEntry1] : coarse breath sounds bilaterally [S3] : no S3 [S4] : no S4 [Click] : no click [Pericardial Rub] : no pericardial rub [Right Carotid Bruit] : no bruit heard over the right carotid [Left Carotid Bruit] : no bruit heard over the left carotid

## 2021-07-12 NOTE — DISCUSSION/SUMMARY
[FreeTextEntry1] : Dr. Chase-(PRIOR VISIT and PMH WITH Dr. Chase): \par This is a 58-year-old female past medical history significant for hypothyroidism, who comes in for cardiac follow up cardiac evaluation.She denies chest pain, shortness of breath, dizziness or syncope.  The patient has been stable on her current medications.\par \par Patient has been complaining of leg cramping on statin therapy.  She self changed her medication to Crestor every other day.  She feels better.  Her cramping was only at nighttime.\par \par The patient will increase her hydration and try Pedialyte.  She will also try coenzyme Q 10.  She will continue every other days Crestor therapy for now.  New blood work was done today.  She will follow-up with me in 2 months.\par \par Her blood pressure under excellent control.\par \par Electrocardiogram done 2019 demonstrated normal sinus rhythm rate 74 beats per minutes otherwise unremarkable.\par \par Echo Doppler examination from 2018 which demonstrated minimal valvular regurgitation with satisfactory left ventricular function. She will followup with me in 4 months.\par \par The patient understands that aerobic exercises must be increased to 40 minutes 4 times per week. A detailed discussion of lifestyle modification was done today. The patient has a good understanding of the diagnosis, and treatment plan. Lifestyle modification was also outlined.\par \par PMH:\par  2018-  when after a vigorous workout with her , she developed a rapid heart rate, didn't feel well, felt particularly hungry. She went to get something to eat as she left the gym, and then sat in the car and felt that she was diaphoretic and shaky. She drank some Gatorade without improvement. She noted that her heart rate was running between  beats per minute on her watch. The patient had completed a vigorous workout on the elliptical  and heart rate went up to 160 beats per minute at that time. She then went to an urgent care center who did not do an electrocardiogram at that time but center to [facility] emergency room. Her blood pressure was noted to be 170/100+, and she was sent out for outpatient followup evaluation. The patient also gets occasional dyspnea on exertion.She was placed on Norvasc 5 mg a day by her primary care physician.\par \par Her cardiac risk factors include positive family support disease her mother had a myocardial infarction and subsequent coronary artery bypass surgery, her father  from myocardial infarction, and a brother recently  within the last 6 months but was diagnosed with atherosclerotic heart disease which was mild. other cardiac risk factors include positive family history, history of smoking (she stopped over 30 years ago), and hypertension.\par \par The patient had a normal exercise stress test 2018. \par \par Lifestyle modification including salt restriction was reinforced. The patient does exercise on regular basis.\par She is instructed to followup with her primary care physician. She'll followup with me in one month.\par I instructed her to increase her hydration during her aerobic workouts.  Her heart rate was most likely secondary to the effects of hypertension.\par She will followup with me in one month.\par

## 2021-07-12 NOTE — REASON FOR VISIT
[Follow-Up - Clinic] : a clinic follow-up of [Palpitations] : palpitations [Hyperlipidemia] : hyperlipidemia [Hypertension] : hypertension [FreeTextEntry1] : murmur

## 2021-07-12 NOTE — CARDIOLOGY SUMMARY
[___] : [unfilled] [No Ischemia] : no Ischemia [LVEF ___%] : LVEF [unfilled]% [de-identified] : 7/12/2021 [de-identified] : 11/13/2018 [de-identified] : 1/13/2018

## 2021-07-13 ENCOUNTER — TRANSCRIPTION ENCOUNTER (OUTPATIENT)
Age: 61
End: 2021-07-13

## 2022-01-07 ENCOUNTER — NON-APPOINTMENT (OUTPATIENT)
Age: 62
End: 2022-01-07

## 2022-01-10 ENCOUNTER — LABORATORY RESULT (OUTPATIENT)
Age: 62
End: 2022-01-10

## 2022-01-10 ENCOUNTER — APPOINTMENT (OUTPATIENT)
Dept: CARDIOLOGY | Facility: CLINIC | Age: 62
End: 2022-01-10
Payer: COMMERCIAL

## 2022-01-10 VITALS
BODY MASS INDEX: 25.39 KG/M2 | DIASTOLIC BLOOD PRESSURE: 79 MMHG | OXYGEN SATURATION: 99 % | HEIGHT: 66 IN | SYSTOLIC BLOOD PRESSURE: 122 MMHG | HEART RATE: 78 BPM | TEMPERATURE: 98 F | RESPIRATION RATE: 15 BRPM | WEIGHT: 158 LBS

## 2022-01-10 DIAGNOSIS — R06.00 DYSPNEA, UNSPECIFIED: ICD-10-CM

## 2022-01-10 DIAGNOSIS — E03.9 HYPOTHYROIDISM, UNSPECIFIED: ICD-10-CM

## 2022-01-10 PROCEDURE — ZZZZZ: CPT

## 2022-01-10 PROCEDURE — 99213 OFFICE O/P EST LOW 20 MIN: CPT | Mod: 25

## 2022-01-10 PROCEDURE — 93015 CV STRESS TEST SUPVJ I&R: CPT

## 2022-01-10 PROCEDURE — 93306 TTE W/DOPPLER COMPLETE: CPT

## 2022-01-10 NOTE — REASON FOR VISIT
[Hyperlipidemia] : hyperlipidemia [Follow-Up - Clinic] : a clinic follow-up of [Hypertension] : hypertension [Palpitations] : palpitations [CV Risk Factors and Non-Cardiac Disease] : CV risk factors and non-cardiac disease [FreeTextEntry1] : murmur

## 2022-01-10 NOTE — ASSESSMENT
[FreeTextEntry1] : Prior note nurse practitioner [facility] 2021::\par \par This is a 60 year year old female here today for follow up cardiac evaluation. \par She has a past medical history significant for  hypothyroidism, HTN and HLD.\par \par She is a retired ID nurse.\par \par CHIEF COMPLAINT: Regular follow up appointment.\par \par -Pt is stable from a cardiac standpoint and does not have any complaints at this time. She does admit to occasional LIZAMA when going up some stairs.\par \par -Her cardiac risk factors include positive family support disease her mother had a myocardial infarction and subsequent coronary artery bypass surgery, her father  from myocardial infarction, and a brother recently  within the last 6 months but was diagnosed with atherosclerotic heart disease which was mild. other cardiac risk factors include positive family history, history of smoking (she stopped over 30 years ago), and hypertension.\par \par BLOOD PRESSURE:\par -BP is well controlled in today's visit and patient is on Telmisartan 40mg PO DAILY.\par \par -I have discussed the importance of maintaining good BP control and reviewed the newest guidelines with the patient while re-enforcing dietary sodium restrictions to no more than 2-3 g daily, DASH diet, life style modifications as well as the goal of maintaining ideal body weight with the patient today. I have advised the patient to avoid the use of over-the-counter medications/ supplements especially NSAIDS.\par \par I have reviewed with Ms. FLOYD that serious health consequences can occur when blood pressure is not well controlled and the need for strict compliance with medication and that optimal control can significantly reduce the risk of cardiovascular disease stroke, heart attack and other organ damage. They verbally expressed understanding of the fore mentioned serious health consequences to me today.\par \par BLOOD WORK:\par -New blood work was done today, 2021 to evaluate lipid profile, CBC, BMP, hepatic function, A1C and TSH. Pt currently taking Rosuvastatin 20mg PO DAILY. \par \par CHOLESTEROL CONTROL:\par -Patient will continue the advised  TLC diet and to continue follow-up for treatment of hyperlipidemia and repeat blood testing with diet and exercise. I have discussed different exercises and the importance of maintenance of optimal body weight. The importance of staying within guidelines and recommendations was stressed to the patient today and they acknowledged that they understand this to me verbally.\par \par  -Ms. FLOYD was educated and advised that failure to follow-up with my medical recommendations to lower cholesterol can result in severe health consequences therefore, they will continuing a low saturated and low fat diet and to avoid excessive carbohydrates to help reduce triglycerides and that lowering LDL levels is associated with a significant decrease in serious cardiac events including but not limited to heart attack stroke and overall death. We will continue lipid lowering agents as advised based on blood test results and the patient understands to call if they develop severe muscle discomfort or if they have a reddish tinted discoloration in their urine.\par \par TESTING/REPORTS:\par -EKG done 2021 which demonstrated regular sinus rhythm with nonspecific ST-T wave changes, BPM of 61\par \par -Echo Doppler examination from 2018 which demonstrated minimal valvular regurgitation with satisfactory left ventricular function. She will followup with me in 4 months.\par \par -The patient had a normal exercise stress test 2018. \par \par PLAN:\par -She will continue with her usual medications and will contact the office if she is having any complaints between now and their next follow up appointment.\par -The patient will schedule an Exercise Stress Test rule out significant coronary artery disease and will schedule \par an Echo Doppler examination to evaluate murmur, left ventricular function, chamber size, and rule out hypertrophy.\par \par I have discussed the plan of care with Ms. CASTILLO FLOYD  and she  will follow up in 6 months. She is compliant with all of her medications.\par \par The patient understands that aerobic exercises must be increased to minutes 4 times/week and a detailed discussion of lifestyle modification was done today. \par The patient has a good understanding of the diagnosis, treatment plan and lifestyle modification. \par She will contact me at the office for any questions with their care or any changes in their health status.\par \par The patient was seen together with supervision physician Dr. Max Chase and the plan of care was discussed with the patient and will be carried out as noted above.\par \par Poly BLAND

## 2022-01-10 NOTE — DISCUSSION/SUMMARY
[FreeTextEntry1] : This is a 61-year-old female past medical history significant for status post COVID-19 infection 2021, hypothyroidism, who comes in for cardiac follow up cardiac evaluation.She denies chest pain, shortness of breath, dizziness or syncope.\par Her cardiac risk factors include positive family support disease her mother had a myocardial infarction and subsequent coronary artery bypass surgery, her father  from myocardial infarction, and a brother recently  within the last 6 months but was diagnosed with atherosclerotic heart disease which was mild. other cardiac risk factors include positive family history, history of smoking (she stopped over 30 years ago), and hypertension. \par The patient has been stable on her current medications.\par The patient had a normal exercise stress test January 10, 2022.\par The patient will have new blood work done today for lipid panel electrolytes.\par She has been stable on her current dose of Micardis 40 mg daily and Crestor 20 mg/day.  She exercises on a regular basis.  She does a [employer] workouts.\par Electrocardiogram done 2019 demonstrated normal sinus rhythm rate 74 beats per minutes otherwise unremarkable.\par \par Echo Doppler examination from 2018 which demonstrated minimal valvular regurgitation with satisfactory left ventricular function. She will followup with me in 4 months.\par \par The patient understands that aerobic exercises must be increased to 40 minutes 4 times per week. A detailed discussion of lifestyle modification was done today. The patient has a good understanding of the diagnosis, and treatment plan. Lifestyle modification was also outlined.\par \par PMH:\par  2018-  when after a vigorous workout with her , she developed a rapid heart rate, didn't feel well, felt particularly hungry. She went to get something to eat as she left the gym, and then sat in the car and felt that she was diaphoretic and shaky. She drank some Gatorade without improvement. She noted that her heart rate was running between  beats per minute on her watch. The patient had completed a vigorous workout on the elliptical  and heart rate went up to 160 beats per minute at that time. She then went to an urgent care center who did not do an electrocardiogram at that time but center to [facility] emergency room. Her blood pressure was noted to be 170/100+, and she was sent out for outpatient followup evaluation. The patient also gets occasional dyspnea on exertion.She was placed on Norvasc 5 mg a day by her primary care physician.\par \par Lifestyle modification including salt restriction was reinforced.\par

## 2022-01-11 RX ORDER — BLOOD SUGAR DIAGNOSTIC
100 STRIP MISCELLANEOUS DAILY
Refills: 0 | Status: ACTIVE | COMMUNITY
Start: 2022-01-11

## 2022-01-11 RX ORDER — MULTIVITAMIN
TABLET ORAL DAILY
Refills: 0 | Status: ACTIVE | COMMUNITY
Start: 2022-01-11

## 2022-01-21 PROBLEM — R06.00 DYSPNEA ON EXERTION: Status: ACTIVE | Noted: 2018-11-13

## 2022-02-19 ENCOUNTER — APPOINTMENT (OUTPATIENT)
Dept: ULTRASOUND IMAGING | Facility: CLINIC | Age: 62
End: 2022-02-19
Payer: COMMERCIAL

## 2022-02-19 ENCOUNTER — OUTPATIENT (OUTPATIENT)
Dept: OUTPATIENT SERVICES | Facility: HOSPITAL | Age: 62
LOS: 1 days | End: 2022-02-19
Payer: COMMERCIAL

## 2022-02-19 DIAGNOSIS — N95.0 POSTMENOPAUSAL BLEEDING: ICD-10-CM

## 2022-02-19 PROCEDURE — 76830 TRANSVAGINAL US NON-OB: CPT

## 2022-02-19 PROCEDURE — 76856 US EXAM PELVIC COMPLETE: CPT | Mod: 26

## 2022-02-19 PROCEDURE — 76856 US EXAM PELVIC COMPLETE: CPT

## 2022-02-19 PROCEDURE — 76830 TRANSVAGINAL US NON-OB: CPT | Mod: 26

## 2022-02-21 ENCOUNTER — RESULT REVIEW (OUTPATIENT)
Age: 62
End: 2022-02-21

## 2022-02-24 ENCOUNTER — APPOINTMENT (OUTPATIENT)
Dept: MAMMOGRAPHY | Facility: IMAGING CENTER | Age: 62
End: 2022-02-24
Payer: COMMERCIAL

## 2022-02-24 ENCOUNTER — OUTPATIENT (OUTPATIENT)
Dept: OUTPATIENT SERVICES | Facility: HOSPITAL | Age: 62
LOS: 1 days | End: 2022-02-24
Payer: COMMERCIAL

## 2022-02-24 ENCOUNTER — APPOINTMENT (OUTPATIENT)
Dept: RADIOLOGY | Facility: IMAGING CENTER | Age: 62
End: 2022-02-24
Payer: COMMERCIAL

## 2022-02-24 DIAGNOSIS — Z00.8 ENCOUNTER FOR OTHER GENERAL EXAMINATION: ICD-10-CM

## 2022-02-24 PROCEDURE — 77067 SCR MAMMO BI INCL CAD: CPT | Mod: 26

## 2022-02-24 PROCEDURE — 76641 ULTRASOUND BREAST COMPLETE: CPT | Mod: 26,50

## 2022-02-24 PROCEDURE — 76641 ULTRASOUND BREAST COMPLETE: CPT

## 2022-02-24 PROCEDURE — 77063 BREAST TOMOSYNTHESIS BI: CPT | Mod: 26

## 2022-02-24 PROCEDURE — 77080 DXA BONE DENSITY AXIAL: CPT

## 2022-02-24 PROCEDURE — 77063 BREAST TOMOSYNTHESIS BI: CPT

## 2022-02-24 PROCEDURE — 77080 DXA BONE DENSITY AXIAL: CPT | Mod: 26

## 2022-02-24 PROCEDURE — 77067 SCR MAMMO BI INCL CAD: CPT

## 2022-03-04 ENCOUNTER — RESULT REVIEW (OUTPATIENT)
Age: 62
End: 2022-03-04

## 2022-03-04 ENCOUNTER — APPOINTMENT (OUTPATIENT)
Dept: GYNECOLOGIC ONCOLOGY | Facility: CLINIC | Age: 62
End: 2022-03-04
Payer: COMMERCIAL

## 2022-03-04 ENCOUNTER — OUTPATIENT (OUTPATIENT)
Dept: OUTPATIENT SERVICES | Facility: HOSPITAL | Age: 62
LOS: 1 days | Discharge: HOME | End: 2022-03-04

## 2022-03-04 VITALS
SYSTOLIC BLOOD PRESSURE: 142 MMHG | DIASTOLIC BLOOD PRESSURE: 90 MMHG | BODY MASS INDEX: 25.55 KG/M2 | WEIGHT: 159 LBS | HEART RATE: 85 BPM | HEIGHT: 66 IN

## 2022-03-04 DIAGNOSIS — C54.1 MALIGNANT NEOPLASM OF ENDOMETRIUM: ICD-10-CM

## 2022-03-04 PROCEDURE — 99204 OFFICE O/P NEW MOD 45 MIN: CPT

## 2022-03-11 ENCOUNTER — APPOINTMENT (OUTPATIENT)
Dept: CT IMAGING | Facility: IMAGING CENTER | Age: 62
End: 2022-03-11
Payer: COMMERCIAL

## 2022-03-11 ENCOUNTER — OUTPATIENT (OUTPATIENT)
Dept: OUTPATIENT SERVICES | Facility: HOSPITAL | Age: 62
LOS: 1 days | End: 2022-03-11
Payer: COMMERCIAL

## 2022-03-11 DIAGNOSIS — C55 MALIGNANT NEOPLASM OF UTERUS, PART UNSPECIFIED: ICD-10-CM

## 2022-03-11 PROCEDURE — 82565 ASSAY OF CREATININE: CPT

## 2022-03-11 PROCEDURE — 74177 CT ABD & PELVIS W/CONTRAST: CPT | Mod: 26

## 2022-03-11 PROCEDURE — 74177 CT ABD & PELVIS W/CONTRAST: CPT

## 2022-03-21 ENCOUNTER — OUTPATIENT (OUTPATIENT)
Dept: OUTPATIENT SERVICES | Facility: HOSPITAL | Age: 62
LOS: 1 days | End: 2022-03-21
Payer: COMMERCIAL

## 2022-03-21 VITALS
HEIGHT: 65.5 IN | HEART RATE: 72 BPM | DIASTOLIC BLOOD PRESSURE: 70 MMHG | OXYGEN SATURATION: 99 % | SYSTOLIC BLOOD PRESSURE: 100 MMHG | TEMPERATURE: 97 F | RESPIRATION RATE: 18 BRPM | WEIGHT: 160.06 LBS

## 2022-03-21 DIAGNOSIS — C55 MALIGNANT NEOPLASM OF UTERUS, PART UNSPECIFIED: ICD-10-CM

## 2022-03-21 DIAGNOSIS — E03.9 HYPOTHYROIDISM, UNSPECIFIED: ICD-10-CM

## 2022-03-21 DIAGNOSIS — I10 ESSENTIAL (PRIMARY) HYPERTENSION: ICD-10-CM

## 2022-03-21 DIAGNOSIS — H26.9 UNSPECIFIED CATARACT: Chronic | ICD-10-CM

## 2022-03-21 DIAGNOSIS — Z01.812 ENCOUNTER FOR PREPROCEDURAL LABORATORY EXAMINATION: ICD-10-CM

## 2022-03-21 DIAGNOSIS — Z98.890 OTHER SPECIFIED POSTPROCEDURAL STATES: Chronic | ICD-10-CM

## 2022-03-21 LAB
A1C WITH ESTIMATED AVERAGE GLUCOSE RESULT: 5.4 % — SIGNIFICANT CHANGE UP (ref 4–5.6)
ALBUMIN SERPL ELPH-MCNC: 4.6 G/DL — SIGNIFICANT CHANGE UP (ref 3.3–5)
ALP SERPL-CCNC: 40 U/L — SIGNIFICANT CHANGE UP (ref 40–120)
ALT FLD-CCNC: 23 U/L — SIGNIFICANT CHANGE UP (ref 4–33)
ANION GAP SERPL CALC-SCNC: 13 MMOL/L — SIGNIFICANT CHANGE UP (ref 7–14)
AST SERPL-CCNC: 29 U/L — SIGNIFICANT CHANGE UP (ref 4–32)
BILIRUB SERPL-MCNC: 0.8 MG/DL — SIGNIFICANT CHANGE UP (ref 0.2–1.2)
BLD GP AB SCN SERPL QL: NEGATIVE — SIGNIFICANT CHANGE UP
BUN SERPL-MCNC: 20 MG/DL — SIGNIFICANT CHANGE UP (ref 7–23)
CALCIUM SERPL-MCNC: 10.2 MG/DL — SIGNIFICANT CHANGE UP (ref 8.4–10.5)
CHLORIDE SERPL-SCNC: 104 MMOL/L — SIGNIFICANT CHANGE UP (ref 98–107)
CO2 SERPL-SCNC: 24 MMOL/L — SIGNIFICANT CHANGE UP (ref 22–31)
CREAT SERPL-MCNC: 0.81 MG/DL — SIGNIFICANT CHANGE UP (ref 0.5–1.3)
EGFR: 82 ML/MIN/1.73M2 — SIGNIFICANT CHANGE UP
ESTIMATED AVERAGE GLUCOSE: 108 — SIGNIFICANT CHANGE UP
GLUCOSE SERPL-MCNC: 103 MG/DL — HIGH (ref 70–99)
HCT VFR BLD CALC: 39.8 % — SIGNIFICANT CHANGE UP (ref 34.5–45)
HGB BLD-MCNC: 12.8 G/DL — SIGNIFICANT CHANGE UP (ref 11.5–15.5)
MCHC RBC-ENTMCNC: 30.2 PG — SIGNIFICANT CHANGE UP (ref 27–34)
MCHC RBC-ENTMCNC: 32.2 GM/DL — SIGNIFICANT CHANGE UP (ref 32–36)
MCV RBC AUTO: 93.9 FL — SIGNIFICANT CHANGE UP (ref 80–100)
NRBC # BLD: 0 /100 WBCS — SIGNIFICANT CHANGE UP
NRBC # FLD: 0 K/UL — SIGNIFICANT CHANGE UP
PLATELET # BLD AUTO: 226 K/UL — SIGNIFICANT CHANGE UP (ref 150–400)
POTASSIUM SERPL-MCNC: 4.1 MMOL/L — SIGNIFICANT CHANGE UP (ref 3.5–5.3)
POTASSIUM SERPL-SCNC: 4.1 MMOL/L — SIGNIFICANT CHANGE UP (ref 3.5–5.3)
PROT SERPL-MCNC: 7.2 G/DL — SIGNIFICANT CHANGE UP (ref 6–8.3)
RBC # BLD: 4.24 M/UL — SIGNIFICANT CHANGE UP (ref 3.8–5.2)
RBC # FLD: 11.8 % — SIGNIFICANT CHANGE UP (ref 10.3–14.5)
RH IG SCN BLD-IMP: POSITIVE — SIGNIFICANT CHANGE UP
SODIUM SERPL-SCNC: 141 MMOL/L — SIGNIFICANT CHANGE UP (ref 135–145)
WBC # BLD: 5 K/UL — SIGNIFICANT CHANGE UP (ref 3.8–10.5)
WBC # FLD AUTO: 5 K/UL — SIGNIFICANT CHANGE UP (ref 3.8–10.5)

## 2022-03-21 PROCEDURE — 93010 ELECTROCARDIOGRAM REPORT: CPT

## 2022-03-21 NOTE — H&P PST ADULT - NSICDXPASTMEDICALHX_GEN_ALL_CORE_FT
PAST MEDICAL HISTORY:  Dyslipidemia     Hypertension     Hypothyroidism     Malignant neoplasm of uterus, part unspecified      PAST MEDICAL HISTORY:  COVID-19 virus infection h/o 11/2021    Dyslipidemia     Hypertension     Hypothyroidism     Malignant neoplasm of uterus, part unspecified

## 2022-03-21 NOTE — H&P PST ADULT - PROBLEM SELECTOR PLAN 1
Pt instructed to take telmisartan on morning of surgery. Pt instructed to take telmisartan on morning of surgery.  copy of recent echo & stress test report in chart .

## 2022-03-21 NOTE — H&P PST ADULT - PROBLEM SELECTOR PLAN 2
Written & verbal preop instructions, gi prophylaxis & surgical soap given  Pt verbalized good understanding.  Teach back done on surgical soap instructions.  YANICK precautions recommended. Scheduled robotic assisted laparoscopic hysterectomy, b/l salpingo oophorectomy, sentinel lymph node mapping, possible staging on 3/31/2022.  Written & verbal preop instructions, gi prophylaxis & surgical soap given  Pt verbalized good understanding.  Teach back done on surgical soap instructions.  YANICK precautions recommended.

## 2022-03-21 NOTE — H&P PST ADULT - HISTORY OF PRESENT ILLNESS
62y/o female presents for   Pt with c/o intermittent light pink staining, evaluated by GYN.  Pap smear - "negative" biopsy - "with abnormal finding.  Ct scan of abdomen and pelvic done.  Preop dx malignant neoplasm of uterus part unspecified.  60y/o female  presents for preop eval for scheduled robotic assisted laparoscopic hysterectomy, b/l salpingo oophorectomy, sentinel lymph node mapping, possible staging.   Pt with c/o intermittent light pink staining, evaluated by GYN.  Pap smear - "negative" biopsy - "with abnormal finding.  Ct scan of abdomen and pelvic done.  Preop dx malignant neoplasm of uterus part unspecified.

## 2022-03-21 NOTE — H&P PST ADULT - NSICDXFAMILYHX_GEN_ALL_CORE_FT
FAMILY HISTORY:  Father  Still living? No  FH: heart attack, Age at diagnosis: Age Unknown  FH: HTN (hypertension), Age at diagnosis: Age Unknown    Mother  Still living? Unknown  FH: HTN (hypertension), Age at diagnosis: Age Unknown  FH: type 2 diabetes, Age at diagnosis: Age Unknown

## 2022-03-21 NOTE — H&P PST ADULT - NSANTHOSAYNRD_GEN_A_CORE
No. YANICK screening performed.  STOP BANG Legend: 0-2 = LOW Risk; 3-4 = INTERMEDIATE Risk; 5-8 = HIGH Risk

## 2022-03-21 NOTE — H&P PST ADULT - ANTERIOR CERVICAL L
Short Stay Endoscopy History and Physical    PCP - Lashawn Sims MD  Referring Physician - Amrit Wang MD  [address] [place] [address]    Procedure - egd  ASA - per anesthesia  Mallampati - per anesthesia  History of Anesthesia problems - no  Family history Anesthesia problems -  no   Plan of anesthesia - General    HPI:  This is a 67 y.o. female here for evaluation of: anorexia    Reflux - no  Dysphagia - no  Abdominal pain - no  Diarrhea - no    ROS:  Constitutional: No fevers, chills, No weight loss  CV: No chest pain  Pulm: No cough, No shortness of breath  Ophtho: No vision changes  GI: see HPI  Derm: No rash    Medical History:  has a past medical history of Abdominal pain, Anorexia, Fatigue, GERD (gastroesophageal reflux disease), Hyperlipidemia, Hypertension, Jaundice, Meniere disease, Nausea, Pancreas cancer, Pancreatic insufficiency, Psoriasis, Weakness, and Weight loss, unintentional.    Surgical History:  has a past surgical history that includes Carpal tunnel release; tarsal tunnel; Tonsillectomy; Hysterectomy; Ear mastoidectomy w/ cochlear implant w/ landmark; Foot surgery; Colonoscopy (N/A, 8/10/2018); Insertion of tunneled central venous catheter (CVC) with subcutaneous port (Right, 6/9/2020); Nissen fundoplication; and Laparoscopic cholecystectomy.    Family History: family history includes Arthritis in her daughter; Cancer in her sister; Dementia in her mother; Hyperlipidemia in her daughter and sister; Hypertension in her mother and sister; No Known Problems in her son; Psoriasis in her maternal grandmother..    Social History:  reports that she has never smoked. She has never used smokeless tobacco. She reports that she does not drink alcohol or use drugs.    Review of patient's allergies indicates:   Allergen Reactions    Celebrex [celecoxib] Anxiety     Elevated BP    Topiramate Anaphylaxis    Latex Rash    Simvastatin Rash       Medications:   Medications Prior  to Admission   Medication Sig Dispense Refill Last Dose    amLODIPine (NORVASC) 5 MG tablet TAKE 1 TABLET(5 MG) BY MOUTH EVERY DAY (Patient taking differently: Take 5 mg by mouth every morning. ) 90 tablet 3 6/25/2020 at 0700    betahistine HCl (BETAHISTINE, BULK, MISC) 12 mg by Misc.(Non-Drug; Combo Route) route every evening.   Past Month at Unknown time    clonazePAM (KLONOPIN) 0.5 MG tablet Take 0.5 mg by mouth every evening.    6/24/2020 at 1930    dicyclomine (BENTYL) 10 MG capsule Take 1 capsule (10 mg total) by mouth 4 (four) times daily. (Patient taking differently: Take 10 mg by mouth 4 (four) times daily. ) 120 capsule 0 6/24/2020 at 1600    HYDROmorphone (DILAUDID) 2 MG tablet Take 1 tablet (2 mg total) by mouth every 3 (three) hours as needed for Pain. 120 tablet 0 Past Week at Unknown time    levothyroxine (SYNTHROID) 50 MCG tablet TAKE 1 TABLET BY MOUTH EVERY DAY (Patient taking differently: Take 50 mcg by mouth before breakfast. TAKE 1 TABLET BY MOUTH EVERY DAY) 90 tablet 1 6/25/2020 at 0700    ondansetron (ZOFRAN-ODT) 8 MG TbDL Take 1 tablet (8 mg total) by mouth every 8 (eight) hours as needed. 60 tablet 6 6/25/2020 at 0800    pantoprazole (PROTONIX) 40 MG tablet Take 40 mg by mouth once daily.   6/25/2020 at 0700    promethazine (PHENERGAN) 25 MG tablet Take 12.5 mg by mouth every 6 (six) hours as needed for Nausea.   6/24/2020 at 1930    spironolactone (ALDACTONE) 25 MG tablet Take 25 mg by mouth every morning.    6/25/2020 at 0700    traMADoL (ULTRAM) 50 mg tablet Take 1 tablet (50 mg total) by mouth every 6 (six) hours as needed for Pain. 90 tablet 0 6/24/2020 at 1930    atorvastatin (LIPITOR) 40 MG tablet TAKE 1 TABLET(40 MG) BY MOUTH EVERY DAY (Patient taking differently: Take 40 mg by mouth every evening. ) 90 tablet 1 6/11/2020    cyproheptadine (PERIACTIN) 4 mg tablet Take 1 tablet (4 mg total) by mouth 4 (four) times daily. 120 tablet 4 6/11/2020    escitalopram oxalate  (LEXAPRO) 5 MG Tab TAKE 1 TABLET(5 MG) BY MOUTH EVERY DAY 30 tablet 11     lidocaine-prilocaine (EMLA) cream Apply topically as needed. (Patient not taking: Reported on 6/16/2020) 30 g 5     lipase-protease-amylase 24,000-76,000-120,000 units (CREON) 24,000-76,000 -120,000 unit capsule Take 1 capsule by mouth 3 (three) times daily with meals. 90 capsule 11 6/11/2020    metoclopramide HCl (REGLAN) 5 MG tablet Take 1 tablet (5 mg total) by mouth 3 (three) times daily. 90 tablet 1 Unknown at Unknown time       Physical Exam:    Vital Signs:   Vitals:    06/25/20 1053   BP: 131/81   Pulse: 94   Resp: 18   Temp: 97.9 °F (36.6 °C)       General Appearance: Well appearing in no acute distress    Labs:  Lab Results   Component Value Date    WBC 6.73 06/15/2020    HGB 12.2 06/15/2020    HCT 38.4 06/15/2020     06/15/2020    CHOL 200 (H) 08/06/2019    TRIG 150 08/06/2019    HDL 69 08/06/2019    ALT 18 06/15/2020    AST 35 06/15/2020     06/15/2020    K 3.5 06/15/2020     06/15/2020    CREATININE 1.1 06/15/2020    BUN 29 (H) 06/15/2020    CO2 25 06/15/2020    TSH 1.594 08/06/2019    HGBA1C 5.4 08/06/2019       I have explained the risks and benefits of this endoscopic procedure to the patient including but not limited to bleeding, inflammation, infection, perforation, and death.      Elijah Mae MD     normal

## 2022-03-25 ENCOUNTER — NON-APPOINTMENT (OUTPATIENT)
Age: 62
End: 2022-03-25

## 2022-03-30 ENCOUNTER — TRANSCRIPTION ENCOUNTER (OUTPATIENT)
Age: 62
End: 2022-03-30

## 2022-03-30 NOTE — ASU PATIENT PROFILE, ADULT - NSICDXPASTMEDICALHX_GEN_ALL_CORE_FT
PAST MEDICAL HISTORY:  COVID-19 virus infection h/o 11/2021    Dyslipidemia     Hypertension     Hypothyroidism     Malignant neoplasm of uterus, part unspecified

## 2022-03-30 NOTE — ASU PATIENT PROFILE, ADULT - NSALCOHOLFREQ_GEN_A_CORE_SD
NONPROLIFERATIVE DIABETIC RETINOPATHY:ENCOURAGE GOOD BLOOD SUGAR AND BLOOD PRESSURE CONTROL.  RETURN FOR FOLLOW-UP AS SCHEDULED FOR DILATED FUNDUS EXAM. monthly or less

## 2022-03-31 ENCOUNTER — RESULT REVIEW (OUTPATIENT)
Age: 62
End: 2022-03-31

## 2022-03-31 ENCOUNTER — NON-APPOINTMENT (OUTPATIENT)
Age: 62
End: 2022-03-31

## 2022-03-31 ENCOUNTER — OUTPATIENT (OUTPATIENT)
Dept: OUTPATIENT SERVICES | Facility: HOSPITAL | Age: 62
LOS: 1 days | Discharge: HOME | End: 2022-03-31
Payer: COMMERCIAL

## 2022-03-31 ENCOUNTER — APPOINTMENT (OUTPATIENT)
Dept: GYNECOLOGIC ONCOLOGY | Facility: HOSPITAL | Age: 62
End: 2022-03-31

## 2022-03-31 ENCOUNTER — TRANSCRIPTION ENCOUNTER (OUTPATIENT)
Age: 62
End: 2022-03-31

## 2022-03-31 VITALS
SYSTOLIC BLOOD PRESSURE: 132 MMHG | HEART RATE: 76 BPM | DIASTOLIC BLOOD PRESSURE: 85 MMHG | TEMPERATURE: 98 F | RESPIRATION RATE: 16 BRPM | OXYGEN SATURATION: 100 % | HEIGHT: 65.5 IN | WEIGHT: 160.06 LBS

## 2022-03-31 VITALS
SYSTOLIC BLOOD PRESSURE: 106 MMHG | TEMPERATURE: 98 F | DIASTOLIC BLOOD PRESSURE: 64 MMHG | OXYGEN SATURATION: 99 % | HEART RATE: 57 BPM | RESPIRATION RATE: 18 BRPM

## 2022-03-31 DIAGNOSIS — C55 MALIGNANT NEOPLASM OF UTERUS, PART UNSPECIFIED: ICD-10-CM

## 2022-03-31 DIAGNOSIS — Z98.890 OTHER SPECIFIED POSTPROCEDURAL STATES: Chronic | ICD-10-CM

## 2022-03-31 DIAGNOSIS — H26.9 UNSPECIFIED CATARACT: Chronic | ICD-10-CM

## 2022-03-31 LAB
BASOPHILS # BLD AUTO: 0.02 K/UL — SIGNIFICANT CHANGE UP (ref 0–0.2)
BASOPHILS NFR BLD AUTO: 0.2 % — SIGNIFICANT CHANGE UP (ref 0–2)
EOSINOPHIL # BLD AUTO: 0.01 K/UL — SIGNIFICANT CHANGE UP (ref 0–0.5)
EOSINOPHIL NFR BLD AUTO: 0.1 % — SIGNIFICANT CHANGE UP (ref 0–6)
GLUCOSE BLDC GLUCOMTR-MCNC: 107 MG/DL — HIGH (ref 70–99)
HCT VFR BLD CALC: 35.7 % — SIGNIFICANT CHANGE UP (ref 34.5–45)
HGB BLD-MCNC: 11.9 G/DL — SIGNIFICANT CHANGE UP (ref 11.5–15.5)
IANC: 10.19 K/UL — HIGH (ref 1.8–7.4)
IMM GRANULOCYTES NFR BLD AUTO: 0.3 % — SIGNIFICANT CHANGE UP (ref 0–1.5)
LYMPHOCYTES # BLD AUTO: 0.96 K/UL — LOW (ref 1–3.3)
LYMPHOCYTES # BLD AUTO: 8.4 % — LOW (ref 13–44)
MCHC RBC-ENTMCNC: 30.7 PG — SIGNIFICANT CHANGE UP (ref 27–34)
MCHC RBC-ENTMCNC: 33.3 GM/DL — SIGNIFICANT CHANGE UP (ref 32–36)
MCV RBC AUTO: 92 FL — SIGNIFICANT CHANGE UP (ref 80–100)
MONOCYTES # BLD AUTO: 0.26 K/UL — SIGNIFICANT CHANGE UP (ref 0–0.9)
MONOCYTES NFR BLD AUTO: 2.3 % — SIGNIFICANT CHANGE UP (ref 2–14)
NEUTROPHILS # BLD AUTO: 10.19 K/UL — HIGH (ref 1.8–7.4)
NEUTROPHILS NFR BLD AUTO: 88.7 % — HIGH (ref 43–77)
NRBC # BLD: 0 /100 WBCS — SIGNIFICANT CHANGE UP
NRBC # FLD: 0 K/UL — SIGNIFICANT CHANGE UP
PLATELET # BLD AUTO: 214 K/UL — SIGNIFICANT CHANGE UP (ref 150–400)
RBC # BLD: 3.88 M/UL — SIGNIFICANT CHANGE UP (ref 3.8–5.2)
RBC # FLD: 11.9 % — SIGNIFICANT CHANGE UP (ref 10.3–14.5)
WBC # BLD: 11.48 K/UL — HIGH (ref 3.8–10.5)
WBC # FLD AUTO: 11.48 K/UL — HIGH (ref 3.8–10.5)

## 2022-03-31 PROCEDURE — 88309 TISSUE EXAM BY PATHOLOGIST: CPT | Mod: 26

## 2022-03-31 PROCEDURE — 88342 IMHCHEM/IMCYTCHM 1ST ANTB: CPT | Mod: 26,1L

## 2022-03-31 PROCEDURE — 88341 IMHCHEM/IMCYTCHM EA ADD ANTB: CPT | Mod: 26,1L

## 2022-03-31 PROCEDURE — 88307 TISSUE EXAM BY PATHOLOGIST: CPT | Mod: 26

## 2022-03-31 PROCEDURE — S2900 ROBOTIC SURGICAL SYSTEM: CPT

## 2022-03-31 PROCEDURE — 38570 LAPAROSCOPY LYMPH NODE BIOP: CPT

## 2022-03-31 PROCEDURE — 88112 CYTOPATH CELL ENHANCE TECH: CPT | Mod: 26

## 2022-03-31 PROCEDURE — 58571 TLH W/T/O 250 G OR LESS: CPT

## 2022-03-31 PROCEDURE — 38900 IO MAP OF SENT LYMPH NODE: CPT | Mod: RT

## 2022-03-31 DEVICE — LIGATING CLIPS WECK HEMOLOK POLYMER LARGE (PURPLE) 6: Type: IMPLANTABLE DEVICE | Status: FUNCTIONAL

## 2022-03-31 RX ORDER — OXYCODONE HYDROCHLORIDE 5 MG/1
1 TABLET ORAL
Qty: 5 | Refills: 0
Start: 2022-03-31

## 2022-03-31 RX ORDER — FENTANYL CITRATE 50 UG/ML
50 INJECTION INTRAVENOUS
Refills: 0 | Status: DISCONTINUED | OUTPATIENT
Start: 2022-03-31 | End: 2022-03-31

## 2022-03-31 RX ORDER — SODIUM CHLORIDE 9 MG/ML
1000 INJECTION, SOLUTION INTRAVENOUS
Refills: 0 | Status: DISCONTINUED | OUTPATIENT
Start: 2022-03-31 | End: 2022-04-14

## 2022-03-31 RX ORDER — ONDANSETRON 8 MG/1
4 TABLET, FILM COATED ORAL ONCE
Refills: 0 | Status: DISCONTINUED | OUTPATIENT
Start: 2022-03-31 | End: 2022-04-14

## 2022-03-31 RX ORDER — OXYCODONE HYDROCHLORIDE 5 MG/1
5 TABLET ORAL ONCE
Refills: 0 | Status: DISCONTINUED | OUTPATIENT
Start: 2022-03-31 | End: 2022-03-31

## 2022-03-31 RX ORDER — ACETAMINOPHEN 500 MG
3 TABLET ORAL
Qty: 0 | Refills: 0 | DISCHARGE

## 2022-03-31 RX ORDER — IBUPROFEN 200 MG
3 TABLET ORAL
Qty: 0 | Refills: 0 | DISCHARGE

## 2022-03-31 RX ORDER — TELMISARTAN 20 MG/1
1 TABLET ORAL
Qty: 0 | Refills: 0 | DISCHARGE

## 2022-03-31 RX ORDER — UBIDECARENONE 100 MG
1 CAPSULE ORAL
Qty: 0 | Refills: 0 | DISCHARGE

## 2022-03-31 RX ORDER — ROSUVASTATIN CALCIUM 5 MG/1
1 TABLET ORAL
Qty: 0 | Refills: 0 | DISCHARGE

## 2022-03-31 RX ORDER — LEVOTHYROXINE SODIUM 125 MCG
1 TABLET ORAL
Qty: 0 | Refills: 0 | DISCHARGE

## 2022-03-31 RX ADMIN — OXYCODONE HYDROCHLORIDE 5 MILLIGRAM(S): 5 TABLET ORAL at 12:00

## 2022-03-31 RX ADMIN — OXYCODONE HYDROCHLORIDE 5 MILLIGRAM(S): 5 TABLET ORAL at 11:18

## 2022-03-31 NOTE — ASU DISCHARGE PLAN (ADULT/PEDIATRIC) - NS MD DC FALL RISK RISK
For information on Fall & Injury Prevention, visit: [email or web address] OR  [email or web address] OR  https://www.cdc.gov/steadi/patient.html

## 2022-03-31 NOTE — ASU DISCHARGE PLAN (ADULT/PEDIATRIC) - ASU DC SPECIAL INSTRUCTIONSFT
Please follow up with Dr. Reese in the office on 4/19/22 at 4:20pm as scheduled for a postoperative check.

## 2022-03-31 NOTE — BRIEF OPERATIVE NOTE - NSICDXBRIEFPROCEDURE_GEN_ALL_CORE_FT
PROCEDURES:  Robot-assisted total hysterectomy for uterus less than 250 grams 31-Mar-2022 10:09:09  Daniella Weaver  Robot-assisted bilateral salpingo-oophorectomy 31-Mar-2022 10:09:21  Daniella Weaver  [place] node mapping with lymph node dissection 31-Mar-2022 10:26:03  Daniella Weaver

## 2022-03-31 NOTE — CHART NOTE - NSCHARTNOTEFT_GEN_A_CORE
PA GynOnc Post Op Note    Pt seen and examined at bedside in PACU resting comfortably.  Pt denies fever, chills, chest pain, SOB, nausea, vomiting, lightheadedness, dizziness.     T(C): 36.4 (03-31-22 @ 12:20), Max: 36.7 (03-31-22 @ 05:49)  HR: 57 (03-31-22 @ 12:20) (51 - 76)  BP: 106/64 (03-31-22 @ 12:20) (92/55 - 132/85)  RR: 18 (03-31-22 @ 12:20) (12 - 19)  SpO2: 99% (03-31-22 @ 12:20) (95% - 100%)    I&O's Detail    31 Mar 2022 07:01  -  31 Mar 2022 13:00  --------------------------------------------------------  IN:  Total IN: 0 mL    OUT:    Voided (mL): 200 mL  Total OUT: 200 mL    Total NET: -200 mL    Physical Exam:  Constitutional: WDWN female, NAD AxOx3  Skin: warm and dry, no breakdowns noted  Chest: s1s2+, RRR, clear to auscultation bilaterally, no w/r/r    Abdomen: soft, nondistended, no guarding, no rebound, + bowel sounds,  Appropriate tenderness noted   Incisional site:  4 scope sites all clean and dry with outer dressings intact.   Extremities: no lower extremity edema or calf tenderness bilaterally    LABS:                        11.9   11.48 )-----------( 214      ( 31 Mar 2022 12:07 )             35.7       a/p: This 61y female, s/p Robotic Assisted TLH, BSO, PPALND for known endometrial carcinoma. EBL: 50cc, pt stable    CV: hemodynamically stable  PUL: adequate on RA  GI: tolerating sips of fluids and crackers  : pt already voided 200cc without dysuria  ID: afebrile, WBC stable, labs as above  DVT prophylaxis: SQ Heparin intraop  Pain Management: controlled presently  Patient is cleared for discharge by Gyn Onc after she meets all the PACU post operative criteria (voiding without dysuria, able to tolerate PO meds and food, and able to ambulate without assistance) Prescriptions sent electronically to patient's pharmacy of choice.   Patient has follow up appointment in 2 weeks  d/w gyn onc team    Shaunna Chapman, PAC  #[id number] spectra

## 2022-03-31 NOTE — ASU DISCHARGE PLAN (ADULT/PEDIATRIC) - NURSING INSTRUCTIONS
You were given 1000mg IV Tylenol for pain management.  Please DO NOT take any Tylenol containing products, such as  Vicodin, Percocet, Excedrin, many cold preparations for the next 6 hours (until  _3:45__ PM).  DO NOT EXCEED 4000MG OF TYLENOL OVER 24 HOURS.       DO NOT take any Ibuprofen ,Advil or Aleeve until after  __4__ PM . You received Toradol during your operation and it can cause damage if too much is taken within a 24 hour time period.

## 2022-03-31 NOTE — ASU DISCHARGE PLAN (ADULT/PEDIATRIC) - CARE PROVIDER_API CALL
Lakia Reese)  Gynecologic Oncology; Obstetrics and Gynecology  [address] [place] [address]  Phone: [phone]  Fax: [phone]  Scheduled Appointment: 04/19/2022 04:20 PM

## 2022-03-31 NOTE — ASU DISCHARGE PLAN (ADULT/PEDIATRIC) - FOLLOW UP APPOINTMENTS
[facility], [facility] May also call Recovery Room (PACU) 24/7 @ [phone]/[facility], [facility]

## 2022-03-31 NOTE — BRIEF OPERATIVE NOTE - SPECIMENS
pelvic washings, right sentinel pelvic lymph node, left sentinel pelvic lymph node, para-aortic sentinel lymph node, uterus, cervix, bilateral fallopian tubes and ovaries

## 2022-03-31 NOTE — ASU DISCHARGE PLAN (ADULT/PEDIATRIC) - PROCEDURE
robot-assisted total laparoscopic hysterectomy, bilateral salpingo-oophorectomy, pelvic and para-aortic sentinel lymph node dissection

## 2022-03-31 NOTE — ASU PREOP CHECKLIST - AS BP NONINV METHOD
PROGRESS NOTE  Patient: Maritza Oliveira Date: 2020   : 1957 Attending: Tanya Estrada MD   62 year old female Author: FELIBERTO Gold NP, entering a Progress Note on Maritza KIMBROUGH [place] for [facility] ER[facility], Dr. Callie Lundberg.     Chief Complaint: Hypothyroidism     Pertinent Reviewed: Allergies, Medical History and Medications    Vital 24 Hour Range Most Recent Value   Temperature Temp  Min: 97.8 °F (36.6 °C)  Max: 98 °F (36.7 °C) 97.8 °F (36.6 °C) (20)   Pulse Pulse  Min: 74  Max: 86 77 (20)   Respiratory Resp  Min: 16  Max: 16 16 (20)   Non-Invasive  Blood Pressure BP  Min: 142/77  Max: 162/74 (!) 162/74 (20)   Pulse Oximetry SpO2  Min: 90 %  Max: 99 % 90 % (20 09)   Arterial  Blood Pressure No data recorded     O2 O2 Flow Rate (L/min)  Avg: 3 L/min  Min: 3 L/min   Min taken time: 20  Max: 3 L/min   Max taken time: 20 3 L/min (20)     Vital Most Recent Value First Value   Weight 56.8 kg (20 173) Weight: 60.5 kg (20 141)   Height 5' (152.4 cm) (20 1904) Height: 5' (152.4 cm) (20 1411)   BMI 24.46 (20 173) N/A     Medications/Infusions:  Scheduled:   • atorvastatin  40 mg Oral Nightly   • pantoprazole  40 mg Oral QAM AC   • levothyroxine  112 mcg Oral QAM AC   • DULoxetine  60 mg Oral Daily   • nortriptyline  50 mg Oral Nightly   • gabapentin  300 mg Oral TID   • mirtazapine  7.5 mg Oral Nightly   • aspirin  81 mg Oral Daily   • budesonide-formoterol  2 puff Inhalation Q12H   • carvedilol  3.125 mg Oral 2 times per day   • clopidogrel  75 mg Oral Daily   • lactulose  40 g Oral BID   • linaclotide  290 mcg Oral QAM AC   • lisinopril  20 mg Oral Daily   • umeclidinium bromide  1 puff Inhalation Daily Resp       Review of Systems:  Patient has been on Levothyroxine 125 mcg daily for management of hypothyroidism.  Labs ordered.   TSH came back low at 0.022.  Free T4 normal at 1.1.      Physical Exam:     General Appearance:    Alert, cooperative, no distress, appears stated age   Neurologic:   Alert and oriented times 4     Laboratory Results:    Recent Labs   Lab 05/17/20  1434   WBC 6.4   HCT 34.2*   HGB 11.3*      SODIUM 138   POTASSIUM 4.8   CHLORIDE 102   CO2 33*   GLUCOSE 91   BUN 10   CREATININE 0.62   ALBUMIN 3.8   BILIRUBIN 0.5   AST 45*       Diagnoses/Plans:  Hypothyroidism.  TSH low at 0.022.  Free T4 normal at 1.1.  Will decrease Levothyroxine from 125 mcg to 112 mcg.  Patient to follow up with primary provider for a recheck in 8 weeks.      Discussed with or notes reviewed:  RN and Patient    Gianna Cardoso NP  [facility] [place] [facility] [place] [facility]   Pager:  [phone]  From 4pm to 7am, please contact the Hospitalist on call at pager [phone].    electronic

## 2022-03-31 NOTE — ASU DISCHARGE PLAN (ADULT/PEDIATRIC) - ACTIVITY LEVEL
No heavy lifting/Nothing per vagina/No tub baths/No douching/No tampons/No intercourse Pt c/o headache and R arm numbness and pain x 1month. Denies any other symptoms

## 2022-04-01 ENCOUNTER — NON-APPOINTMENT (OUTPATIENT)
Age: 62
End: 2022-04-01

## 2022-04-04 LAB — NON-GYNECOLOGICAL CYTOLOGY STUDY: SIGNIFICANT CHANGE UP

## 2022-04-12 ENCOUNTER — NON-APPOINTMENT (OUTPATIENT)
Age: 62
End: 2022-04-12

## 2022-04-12 LAB — SURGICAL PATHOLOGY STUDY: SIGNIFICANT CHANGE UP

## 2022-04-19 ENCOUNTER — APPOINTMENT (OUTPATIENT)
Dept: GYNECOLOGIC ONCOLOGY | Facility: CLINIC | Age: 62
End: 2022-04-19
Payer: COMMERCIAL

## 2022-04-19 VITALS
TEMPERATURE: 98 F | SYSTOLIC BLOOD PRESSURE: 155 MMHG | HEART RATE: 73 BPM | WEIGHT: 159 LBS | BODY MASS INDEX: 25.55 KG/M2 | DIASTOLIC BLOOD PRESSURE: 93 MMHG | HEIGHT: 66 IN

## 2022-04-19 PROCEDURE — 99024 POSTOP FOLLOW-UP VISIT: CPT

## 2022-04-20 PROBLEM — E03.9 HYPOTHYROIDISM, UNSPECIFIED: Chronic | Status: ACTIVE | Noted: 2022-03-21

## 2022-04-20 PROBLEM — E78.5 HYPERLIPIDEMIA, UNSPECIFIED: Chronic | Status: ACTIVE | Noted: 2022-03-21

## 2022-04-20 PROBLEM — C55 MALIGNANT NEOPLASM OF UTERUS, PART UNSPECIFIED: Chronic | Status: ACTIVE | Noted: 2022-03-21

## 2022-04-20 PROBLEM — U07.1 COVID-19: Chronic | Status: ACTIVE | Noted: 2022-03-21

## 2022-04-20 PROBLEM — I10 ESSENTIAL (PRIMARY) HYPERTENSION: Chronic | Status: ACTIVE | Noted: 2022-03-21

## 2022-04-21 ENCOUNTER — NON-APPOINTMENT (OUTPATIENT)
Age: 62
End: 2022-04-21

## 2022-04-21 ENCOUNTER — OUTPATIENT (OUTPATIENT)
Dept: OUTPATIENT SERVICES | Facility: HOSPITAL | Age: 62
LOS: 1 days | Discharge: HOME | End: 2022-04-21
Payer: COMMERCIAL

## 2022-04-21 ENCOUNTER — APPOINTMENT (OUTPATIENT)
Dept: RADIATION ONCOLOGY | Facility: CLINIC | Age: 62
End: 2022-04-21
Payer: COMMERCIAL

## 2022-04-21 VITALS
BODY MASS INDEX: 26.24 KG/M2 | TEMPERATURE: 36.6 F | OXYGEN SATURATION: 100 % | HEART RATE: 72 BPM | WEIGHT: 162.59 LBS | DIASTOLIC BLOOD PRESSURE: 93 MMHG | SYSTOLIC BLOOD PRESSURE: 152 MMHG | RESPIRATION RATE: 16 BRPM

## 2022-04-21 DIAGNOSIS — Z98.890 OTHER SPECIFIED POSTPROCEDURAL STATES: Chronic | ICD-10-CM

## 2022-04-21 DIAGNOSIS — H26.9 UNSPECIFIED CATARACT: Chronic | ICD-10-CM

## 2022-04-21 PROCEDURE — 99204 OFFICE O/P NEW MOD 45 MIN: CPT | Mod: 25

## 2022-04-21 PROCEDURE — 77263 THER RADIOLOGY TX PLNG CPLX: CPT

## 2022-04-21 PROCEDURE — 77470 SPECIAL RADIATION TREATMENT: CPT | Mod: 26

## 2022-04-21 NOTE — VITALS
[Maximal Pain Intensity: 0/10] : 0/10 [Least Pain Intensity: 0/10] : 0/10 [90: Able to carry normal activity; minor signs or symptoms of disease.] : 90: Able to carry normal activity; minor signs or symptoms of disease.  [ECOG Performance Status: 1 - Restricted in physically strenuous activity but ambulatory and able to carry out work of a light or sedentary nature] : Performance Status: 1 - Restricted in physically strenuous activity but ambulatory and able to carry out work of a light or sedentary nature, e.g., light house work, office work [Date: ____________] : Patient's last distress assessment performed on [unfilled]. [6 - Distress Level] : Distress Level: 6 [Referred Patient  to social work for follow-up] : Patient was referred to social work for follow-up

## 2022-05-03 NOTE — OB/GYN HISTORY
[History of Birth Control Pills] : Patient has a history of taking birth control pills [Currently In Menopause] : currently in menopause [___] : Total Pregnancies: [unfilled] [Definite:  ___ (Date)] : the last menstrual period was [unfilled] [History of Hormone Replacement Therapy] : no history of hormone replacement therapy [Experiencing Menopausal Sxs] : not experiencing menopausal symptoms

## 2022-05-03 NOTE — HISTORY OF PRESENT ILLNESS
[FreeTextEntry1] : Ms. FLOYD is a 61 year year old female with Stage IB, grade 1 endometrial CA who underwent surgery with Dr. Reese. \par \par History of Present Illness: \par 3/4/22 - Referred to Dr. Reese by Dr. Dixon for surgical management of postmenopausal bleeding, with a new diagnosis of endometrial cancer.\par \par 2/19/22- Pelvic US: Uterus- 7.3 x 4.4 x 5.8 cm, there is a solid mass inside the endometrial cavity 3.6 x 2.5 cm. Bilateral ovaries WNL.\par \par 2/21/22- Endometrial Biopsy- Endometrioid adenocarcinoma, FIGO grade 1\par \par 2/24/22 - Mammogram: Probably benign right breast calcifications most pronounced in the lateral breast, suggestive of early vascular calcifications. No mammographic evidence of malignancy in the left breast. No sonographic evidence of malignancy. BIRADS 3. \par  - Bone density: Normal bone density. \par \par 3/11/22 - CT A/P: Central uterine mass. No CT evidence of metastatic disease. Dilated right renal pelvis suggestive of low-grade UPJ obstruction.\par \par 3/31/22 - RA TLH, BSO, pelvic & para-aortic LND: Endometrial adenocarcinoma, well differentiated endometrioid type with prominent secretory features. Tumor dimension, gross: 4.5 x 3.0 x 2.0 cm, anterior and posterior uterine walls, with necrosis and inflammation. Invasive adenocarcinoma, slide 3J2, involves approximately 75 % thickness of uterine wall; average uterus wall thickness remote from tumor is 1.1 cm. (Uterus wall thickness at site of maximum myometrial invasion can not be determined--absent endomyometrial landmarks). Lower uterine segment invasive adenocarcinoma is identified; cervix stromal invasion is not identified. Angiolymphatic invasion is not identified. Pelvic wash negative for malignant cells. \par -Cervix: Nabothian cysts, endocervical tunnel clusters\par -Endometrium: Endometrial adenocarcinoma in situ\par -Myometrium: Leiomyomas; adenomyosis\par -Right ovary: Histologically unremarkable, negative for tumor\par -Left ovary:Histologically unremarkable, negative for tumor\par -Fallopian tubes, right: Adenomatoid tumur, fimbriated end (0.3 cm)\par -Fallopian tube, left: Histologically unremarkable\par AJCC TNM STAGE:  pT1b  pN0 (sn)  pMX\par FIGO:  IB\par  - Right pelvic SLN: Three reactive lymph nodes, negative for tumor (0/3). AE1/AE3 cytokeratin stain is negative.\par  - Left pelvic SLN: Six lymph nodes, negative for tumor (0/6). AE1/AE3 cytokeratin stain is negative for tumor.\par  -  Para-aortic SLN: Two lymph nodes, negative for tumor (0/2). AE1/AE3 cystokeratin stain is negative for tumor.\par \par She presents today for discussion of radiation therapy.

## 2022-05-03 NOTE — REVIEW OF SYSTEMS
[Night Sweats] : night sweats [Nocturia] : nocturia [Anxiety] : anxiety [Chills] : no chills [Fever] : no fever [Recent Change In Weight] : ~T no recent weight change [Visual Disturbances] : no visual disturbances [Dysphagia] : no dysphagia [Hearing Disturbances] : no hearing disturbances [Chest Pain] : no chest pain [Palpitations] : no palpitations [Lower Ext Edema] : no lower extremity edema [Shortness Of Breath] : no shortness of breath [Wheezing] : no wheezing [Cough] : no cough [Abdominal Pain] : no abdominal pain [Constipation] : no constipation [Diarrhea] : no diarrhea [Dysmenorrhea/Abn Vaginal Bleeding] : no dysmenorrhea/abnormal vaginal bleeding [Joint Pain] : no joint pain [Muscle Pain] : no muscle pain [Skin Rash] : no skin rash [Skin Wound] : no skin wound [Dizziness] : no dizziness [Fainting] : no fainting [Suicidal] : not suicidal [Depression] : no depression [Hot Flashes] : no hot flashes [Easy Bleeding] : no tendency for easy bleeding [Easy Bruising] : no tendency for easy bruising

## 2022-05-16 PROCEDURE — 77334 RADIATION TREATMENT AID(S): CPT | Mod: 26

## 2022-05-16 PROCEDURE — 77290 THER RAD SIMULAJ FIELD CPLX: CPT | Mod: 26

## 2022-05-18 PROCEDURE — 77295 3-D RADIOTHERAPY PLAN: CPT | Mod: 26

## 2022-05-19 PROCEDURE — 57156 INS VAG BRACHYTX DEVICE: CPT

## 2022-05-19 PROCEDURE — 77770 HDR RDNCL NTRSTL/ICAV BRCHTX: CPT | Mod: 26

## 2022-05-19 PROCEDURE — 77332 RADIATION TREATMENT AID(S): CPT | Mod: 26

## 2022-05-24 ENCOUNTER — NON-APPOINTMENT (OUTPATIENT)
Age: 62
End: 2022-05-24

## 2022-05-24 PROCEDURE — 77332 RADIATION TREATMENT AID(S): CPT | Mod: 26

## 2022-05-24 PROCEDURE — 57156 INS VAG BRACHYTX DEVICE: CPT

## 2022-05-24 PROCEDURE — 77770 HDR RDNCL NTRSTL/ICAV BRCHTX: CPT | Mod: 26

## 2022-05-31 ENCOUNTER — RX RENEWAL (OUTPATIENT)
Age: 62
End: 2022-05-31

## 2022-06-01 PROCEDURE — 77770 HDR RDNCL NTRSTL/ICAV BRCHTX: CPT | Mod: 26

## 2022-06-01 PROCEDURE — 77332 RADIATION TREATMENT AID(S): CPT | Mod: 26

## 2022-06-01 PROCEDURE — 57156 INS VAG BRACHYTX DEVICE: CPT

## 2022-06-14 NOTE — ASU DISCHARGE PLAN (ADULT/PEDIATRIC) - CLICK TO LAUNCH ORM
Large Joint Aspiration/Injection: L knee    Date/Time: 6/14/2022 1:15 PM  Performed by: Amparo Barcenas PA-C  Authorized by: Amparo Barcenas PA-C     Consent Done?:  Yes (Verbal)  Indications:  Pain  Site marked: the procedure site was marked    Timeout: prior to procedure the correct patient, procedure, and site was verified    Prep: patient was prepped and draped in usual sterile fashion    Local anesthesia used?: No    Local anesthetic:  Topical anesthetic and lidocaine 1% without epinephrine    Details:  Needle Size:  22 G  Ultrasonic Guidance for needle placement?: No    Approach:  Anterolateral  Location:  Knee  Site:  L knee  Medications:  40 mg triamcinolone acetonide 40 mg/mL  Patient tolerance:  Patient tolerated the procedure well with no immediate complications      
.

## 2022-07-06 ENCOUNTER — APPOINTMENT (OUTPATIENT)
Dept: RADIATION ONCOLOGY | Facility: CLINIC | Age: 62
End: 2022-07-06

## 2022-07-06 VITALS
HEART RATE: 72 BPM | DIASTOLIC BLOOD PRESSURE: 81 MMHG | SYSTOLIC BLOOD PRESSURE: 121 MMHG | OXYGEN SATURATION: 100 % | TEMPERATURE: 96.8 F | WEIGHT: 164 LBS | HEIGHT: 66 IN | RESPIRATION RATE: 17 BRPM | BODY MASS INDEX: 26.36 KG/M2

## 2022-07-06 DIAGNOSIS — Z92.3 PERSONAL HISTORY OF IRRADIATION: ICD-10-CM

## 2022-07-06 PROCEDURE — 99214 OFFICE O/P EST MOD 30 MIN: CPT

## 2022-07-06 NOTE — PHYSICAL EXAM
Addended by: VANNA DE LA FUENTE on: 5/16/2022 02:30 PM     Modules accepted: Orders     [General Appearance - In No Acute Distress] : in no acute distress [Abdomen Soft] : soft [Normal External Genitalia] : normal external genitalia  [Normal Vaginal Cuff] : vaginal cuff without lesion or nodularity

## 2022-07-06 NOTE — HISTORY OF PRESENT ILLNESS
[FreeTextEntry1] : Ms. FLOYD is a 61 year old woman with Stage IB, grade 1 endometrial adenocarcinoma S/P comprehensive surgical staging with Dr. Reese followed byadjuvant VBT.  \par \par \par History of Present Illness:  \par 3/4/22 - Referred to Dr. Reese by Dr. Dixon for surgical management of postmenopausal bleeding, with a new diagnosis of endometrial cancer. \par \par 2/19/22- Pelvic US: Uterus- 7.3 x 4.4 x 5.8 cm, there is a solid mass inside the endometrial cavity 3.6 x 2.5 cm. Bilateral ovaries WNL. \par \par 2/21/22- Endometrial Biopsy- Endometrioid adenocarcinoma, FIGO grade 1   \par \par 2/24/22 - Mammogram: Probably benign right breast calcifications most pronounced in the lateral breast, suggestive of early vascular calcifications. No mammographic evidence of malignancy in the left breast. No sonographic evidence of malignancy. BIRADS 3.  \par - Bone density: Normal bone density.    \par \par 3/11/22 - CT A/P: Central uterine mass. No CT evidence of metastatic disease. Dilated right renal pelvis suggestive of low-grade UPJ obstruction.   \par \par 3/31/22 - RA TLH, BSO, pelvic & para-aortic LND: Endometrial adenocarcinoma, well differentiated endometrioid type with prominent secretory features. Tumor dimension, gross: 4.5 x 3.0 x 2.0 cm, anterior and posterior uterine walls, with necrosis and inflammation. Invasive adenocarcinoma, slide 3J2, involves approximately 75 % thickness of uterine wall; average uterus wall thickness remote from tumor is 1.1 cm. (Uterus wall thickness at site of maximum myometrial invasion can not be determined--absent endomyometrial landmarks). Lower uterine segment invasive adenocarcinoma is identified; cervix stromal invasion is not identified. Angiolymphatic invasion is not identified. Pelvic wash negative for malignant cells.  \par -Cervix: Nabothian cysts, endocervical tunnel clusters \par -Endometrium: Endometrial adenocarcinoma in situ \par -Myometrium: Leiomyomas; adenomyosis \par -Right ovary: Histologically unremarkable, negative for tumor \par -Left ovary:Histologically unremarkable, negative for tumor \par -Fallopian tubes, right: Adenomatoid tumur, fimbriated end (0.3 cm) \par -Fallopian tube, left: Histologically unremarkable \par AJCC TNM STAGE: pT1b pN0 (sn) pMX \par FIGO: IB \par - Right pelvic SLN: Three reactive lymph nodes, negative for tumor (0/3). AE1/AE3 cytokeratin stain is negative. \par - Left pelvic SLN: Six lymph nodes, negative for tumor (0/6). AE1/AE3 cytokeratin stain is negative for tumor. \par - Para-aortic SLN: Two lymph nodes, negative for tumor (0/2). AE1/AE3 cystokeratin stain is negative for tumor. \par \par 5/19/22 - 6/1/22 - VBT to a total dose of 2,100 cGy in 3 fractions.  \par  \par \par \par She presents today for post-treatment evaluation. Today she is feeling well. Denies any pain. Fatigue resolved. No urinary or bowel complaints. Sometimes feels vaginal tightness, especially after working out. No vaginal discharge/bleeding.

## 2022-08-02 ENCOUNTER — APPOINTMENT (OUTPATIENT)
Dept: GYNECOLOGIC ONCOLOGY | Facility: CLINIC | Age: 62
End: 2022-08-02

## 2022-08-02 VITALS
DIASTOLIC BLOOD PRESSURE: 83 MMHG | WEIGHT: 162 LBS | HEIGHT: 66 IN | HEART RATE: 75 BPM | SYSTOLIC BLOOD PRESSURE: 137 MMHG | BODY MASS INDEX: 26.03 KG/M2

## 2022-08-02 PROCEDURE — 99213 OFFICE O/P EST LOW 20 MIN: CPT

## 2022-08-15 ENCOUNTER — APPOINTMENT (OUTPATIENT)
Dept: MAMMOGRAPHY | Facility: IMAGING CENTER | Age: 62
End: 2022-08-15

## 2022-08-15 ENCOUNTER — OUTPATIENT (OUTPATIENT)
Dept: OUTPATIENT SERVICES | Facility: HOSPITAL | Age: 62
LOS: 1 days | End: 2022-08-15
Payer: COMMERCIAL

## 2022-08-15 DIAGNOSIS — Z98.890 OTHER SPECIFIED POSTPROCEDURAL STATES: Chronic | ICD-10-CM

## 2022-08-15 DIAGNOSIS — H26.9 UNSPECIFIED CATARACT: Chronic | ICD-10-CM

## 2022-08-15 DIAGNOSIS — Z00.8 ENCOUNTER FOR OTHER GENERAL EXAMINATION: ICD-10-CM

## 2022-08-15 PROCEDURE — 77065 DX MAMMO INCL CAD UNI: CPT | Mod: 26,RT

## 2022-08-15 PROCEDURE — 77065 DX MAMMO INCL CAD UNI: CPT

## 2022-08-16 ENCOUNTER — RX RENEWAL (OUTPATIENT)
Age: 62
End: 2022-08-16

## 2022-10-29 ENCOUNTER — NON-APPOINTMENT (OUTPATIENT)
Age: 62
End: 2022-10-29

## 2022-12-06 ENCOUNTER — APPOINTMENT (OUTPATIENT)
Dept: GYNECOLOGIC ONCOLOGY | Facility: CLINIC | Age: 62
End: 2022-12-06

## 2022-12-06 VITALS
WEIGHT: 161 LBS | BODY MASS INDEX: 25.88 KG/M2 | SYSTOLIC BLOOD PRESSURE: 144 MMHG | HEART RATE: 74 BPM | DIASTOLIC BLOOD PRESSURE: 84 MMHG | HEIGHT: 66 IN

## 2022-12-06 PROCEDURE — 99213 OFFICE O/P EST LOW 20 MIN: CPT

## 2022-12-10 NOTE — H&P PST ADULT - ACTIVITY
Addended by: ARASH PAUL on: 12/10/2022 11:49 AM     Modules accepted: Level of Service    
cardio 4-5/week  30min, wgts with  1hr sessions

## 2023-01-18 ENCOUNTER — NON-APPOINTMENT (OUTPATIENT)
Age: 63
End: 2023-01-18

## 2023-01-18 ENCOUNTER — LABORATORY RESULT (OUTPATIENT)
Age: 63
End: 2023-01-18

## 2023-01-18 ENCOUNTER — APPOINTMENT (OUTPATIENT)
Dept: CARDIOLOGY | Facility: CLINIC | Age: 63
End: 2023-01-18
Payer: COMMERCIAL

## 2023-01-18 VITALS
TEMPERATURE: 97.7 F | HEIGHT: 66 IN | WEIGHT: 157 LBS | SYSTOLIC BLOOD PRESSURE: 126 MMHG | DIASTOLIC BLOOD PRESSURE: 79 MMHG | RESPIRATION RATE: 16 BRPM | BODY MASS INDEX: 25.23 KG/M2 | OXYGEN SATURATION: 98 % | HEART RATE: 65 BPM

## 2023-01-18 PROCEDURE — 93000 ELECTROCARDIOGRAM COMPLETE: CPT

## 2023-01-18 PROCEDURE — 99214 OFFICE O/P EST MOD 30 MIN: CPT

## 2023-01-18 NOTE — REASON FOR VISIT
[CV Risk Factors and Non-Cardiac Disease] : CV risk factors and non-cardiac disease [Hyperlipidemia] : hyperlipidemia [Follow-Up - Clinic] : a clinic follow-up of [Hypertension] : hypertension [Palpitations] : palpitations [FreeTextEntry1] : murmur

## 2023-01-18 NOTE — DISCUSSION/SUMMARY
[FreeTextEntry1] : This is a 62-year-old female past medical history significant for hypertension, hyperlipidemia, status post hysterectomy for uterine cancer followed by 3 radiation treatments in , hypothyroidism, who comes in for cardiac follow up cardiac evaluation.She denies chest pain, shortness of breath, dizziness or syncope.  The patient has been stable on her current medications.\par Her cardiac risk factors include positive family support disease her mother had a myocardial infarction and subsequent coronary artery bypass surgery, her father  from myocardial infarction, and a brother recently  within the last 6 months but was diagnosed with atherosclerotic heart disease which was mild. other cardiac risk factors include positive family history, history of smoking (she stopped over 30 years ago), and hypertension\par Electrocardiogram done 2023 demonstrated normal sinus rhythm rate of 65 bpm is otherwise unremarkable.\par Echo Doppler examination done January 10, 2022 demonstrated mild mitral valve regurgitation, minimal aortic valve regurgitation, mild tricuspid valve regurgitation and minimal pulmonic valve regurgitation with satisfactory left ventricular function ejection fraction 67%\par Lipid panel done January 10, 2023 demonstrated a cholesterol of 229, HDL of 93, triglycerides 189, LDL calculated 98 mg/dL, LDL direct 111 mg/dL.\par She will continue on her current dose of Crestor 20 mg daily.\par She will have follow-up blood work done in 4 months.  Hopefully at that time her LDL cholesterol will be less than 100 mg/dL.  If she has not had a target level at that time consideration can be given to the addition of Zetia to her current medical regimen.\par She is currently hemodynamically stable and asymptomatic from a cardiac standpoint.\par \par Her blood pressure under excellent control.\par \par Electrocardiogram done 2019 demonstrated normal sinus rhythm rate 74 beats per minutes otherwise unremarkable.\par \par Echo Doppler examination from 2018 which demonstrated minimal valvular regurgitation with satisfactory left ventricular function. She will followup with me in 4 months.\par \par The patient understands that aerobic exercises must be increased to 40 minutes 4 times per week. A detailed discussion of lifestyle modification was done today. The patient has a good understanding of the diagnosis, and treatment plan. Lifestyle modification was also outlined.\par \par PMH:\par  2018-  when after a vigorous workout with her , she developed a rapid heart rate, didn't feel well, felt particularly hungry. She went to get something to eat as she left the gym, and then sat in the car and felt that she was diaphoretic and shaky. She drank some Gatorade without improvement. She noted that her heart rate was running between  beats per minute on her watch. The patient had completed a vigorous workout on the elliptical  and heart rate went up to 160 beats per minute at that time. She then went to an urgent care center who did not do an electrocardiogram at that time but center to [facility] emergency room. Her blood pressure was noted to be 170/100+, and she was sent out for outpatient followup evaluation. The patient also gets occasional dyspnea on exertion.She was placed on Norvasc 5 mg a day by her primary care physician.\par \par The patient had a normal exercise stress test 2018. \par \par Lifestyle modification including salt restriction was reinforced. The patient does exercise on regular basis.\par She is instructed to followup with her primary care physician. She'll followup with me in one month.\par I instructed her to increase her hydration during her aerobic workouts.  Her heart rate was most likely secondary to the effects of hypertension.\par She will followup with me in one month.\par

## 2023-01-18 NOTE — CARDIOLOGY SUMMARY
[___] : [unfilled] [No Ischemia] : no Ischemia [LVEF ___%] : LVEF [unfilled]% [de-identified] : 7/12/2021 [de-identified] : 11/13/2018 [de-identified] : 1/13/2018

## 2023-01-18 NOTE — PHYSICAL EXAM
[Normal Rate] : normal [Well Developed] : well developed [Well Nourished] : well nourished [No Acute Distress] : no acute distress [Normal Venous Pressure] : normal venous pressure [No Carotid Bruit] : no carotid bruit [Normal S1, S2] : normal S1, S2 [No Rub] : no rub [Clear Lung Fields] : clear lung fields [Good Air Entry] : good air entry [No Respiratory Distress] : no respiratory distress  [Soft] : abdomen soft [Non Tender] : non-tender [No Masses/organomegaly] : no masses/organomegaly [Normal Bowel Sounds] : normal bowel sounds [Normal Gait] : normal gait [No Edema] : no edema [No Cyanosis] : no cyanosis [No Clubbing] : no clubbing [No Varicosities] : no varicosities [No Rash] : no rash [No Skin Lesions] : no skin lesions [Moves all extremities] : moves all extremities [No Focal Deficits] : no focal deficits [Normal Speech] : normal speech [Alert and Oriented] : alert and oriented [Normal memory] : normal memory [General Appearance - Well Developed] : well developed [Normal Appearance] : normal appearance [Well Groomed] : well groomed [General Appearance - Well Nourished] : well nourished [No Deformities] : no deformities [General Appearance - In No Acute Distress] : no acute distress [Normal Conjunctiva] : the conjunctiva exhibited no abnormalities [Normal Oral Mucosa] : normal oral mucosa [Normal Jugular Venous A Waves Present] : normal jugular venous A waves present [Normal Jugular Venous V Waves Present] : normal jugular venous V waves present [No Jugular Venous Estrada A Waves] : no jugular venous estrada A waves [Respiration, Rhythm And Depth] : normal respiratory rhythm and effort [Exaggerated Use Of Accessory Muscles For Inspiration] : no accessory muscle use [Bowel Sounds] : normal bowel sounds [Abdomen Soft] : soft [Abnormal Walk] : normal gait [Gait - Sufficient For Exercise Testing] : the gait was sufficient for exercise testing [Nail Clubbing] : no clubbing of the fingernails [Cyanosis, Localized] : no localized cyanosis [Skin Color & Pigmentation] : normal skin color and pigmentation [Skin Turgor] : normal skin turgor [] : no rash [Oriented To Time, Place, And Person] : oriented to person, place, and time [Affect] : the affect was normal [Mood] : the mood was normal [No Anxiety] : not feeling anxious [5th Left ICS - MCL] : palpated at the 5th LICS in the midclavicular line [Normal] : normal [No Precordial Heave] : no precordial heave was noted [Bradycardia] : bradycardic [Heart Rate ___] : [unfilled] bpm [Rhythm Regular] : regular [Normal S1] : normal S1 [Normal S2] : normal S2 [No Gallop] : no gallop heard [I] : a grade 1 [2+] : left 2+ [No Abnormalities] : the abdominal aorta was not enlarged and no bruit was heard [No Pitting Edema] : no pitting edema present [Right Femoral Bruit] : no bruit heard over the right femoral artery [Left Femoral Bruit] : no bruit heard over the left femoral artery [FreeTextEntry1] : coarse breath sounds bilaterally [S3] : no S3 [S4] : no S4 [Click] : no click [Distant] : the heart sounds were ~L not distant [Pericardial Rub] : no pericardial rub [Right Carotid Bruit] : no bruit heard over the right carotid [Left Carotid Bruit] : no bruit heard over the left carotid

## 2023-04-25 ENCOUNTER — APPOINTMENT (OUTPATIENT)
Dept: GYNECOLOGIC ONCOLOGY | Facility: CLINIC | Age: 63
End: 2023-04-25
Payer: COMMERCIAL

## 2023-04-25 VITALS
WEIGHT: 158 LBS | BODY MASS INDEX: 25.39 KG/M2 | HEART RATE: 70 BPM | SYSTOLIC BLOOD PRESSURE: 137 MMHG | DIASTOLIC BLOOD PRESSURE: 93 MMHG | HEIGHT: 66 IN

## 2023-04-25 PROCEDURE — 99213 OFFICE O/P EST LOW 20 MIN: CPT

## 2023-06-28 ENCOUNTER — APPOINTMENT (OUTPATIENT)
Dept: MAMMOGRAPHY | Facility: IMAGING CENTER | Age: 63
End: 2023-06-28
Payer: COMMERCIAL

## 2023-06-28 ENCOUNTER — APPOINTMENT (OUTPATIENT)
Dept: ULTRASOUND IMAGING | Facility: IMAGING CENTER | Age: 63
End: 2023-06-28
Payer: COMMERCIAL

## 2023-06-28 ENCOUNTER — OUTPATIENT (OUTPATIENT)
Dept: OUTPATIENT SERVICES | Facility: HOSPITAL | Age: 63
LOS: 1 days | End: 2023-06-28
Payer: COMMERCIAL

## 2023-06-28 DIAGNOSIS — Z00.8 ENCOUNTER FOR OTHER GENERAL EXAMINATION: ICD-10-CM

## 2023-06-28 DIAGNOSIS — H26.9 UNSPECIFIED CATARACT: Chronic | ICD-10-CM

## 2023-06-28 DIAGNOSIS — Z98.890 OTHER SPECIFIED POSTPROCEDURAL STATES: Chronic | ICD-10-CM

## 2023-06-28 PROCEDURE — 77066 DX MAMMO INCL CAD BI: CPT | Mod: 26

## 2023-06-28 PROCEDURE — 77066 DX MAMMO INCL CAD BI: CPT

## 2023-06-28 PROCEDURE — 76641 ULTRASOUND BREAST COMPLETE: CPT | Mod: 26,50

## 2023-06-28 PROCEDURE — 76641 ULTRASOUND BREAST COMPLETE: CPT

## 2023-06-28 PROCEDURE — G0279: CPT | Mod: 26

## 2023-06-28 PROCEDURE — G0279: CPT

## 2023-08-21 ENCOUNTER — NON-APPOINTMENT (OUTPATIENT)
Age: 63
End: 2023-08-21

## 2023-08-30 ENCOUNTER — APPOINTMENT (OUTPATIENT)
Dept: GYNECOLOGIC ONCOLOGY | Facility: CLINIC | Age: 63
End: 2023-08-30
Payer: COMMERCIAL

## 2023-08-30 VITALS
HEART RATE: 87 BPM | SYSTOLIC BLOOD PRESSURE: 130 MMHG | DIASTOLIC BLOOD PRESSURE: 81 MMHG | WEIGHT: 160 LBS | BODY MASS INDEX: 25.82 KG/M2

## 2023-08-30 PROCEDURE — 99213 OFFICE O/P EST LOW 20 MIN: CPT

## 2023-08-30 NOTE — HISTORY OF PRESENT ILLNESS
[FreeTextEntry1] : Stage IBG1 endometrial adenocarcinoma, DOI 75% MSI-H with MLH1 promotor methylation R-A TLH, BSO, PPALND, 3/31/22 VBT x 3 from 5/19/22-6/1/22 (2100cGy)  Referred by (GYN) Dr. Kaiser Cardiologist: Dr. Hamm  Rad Onc - Dr. Leon  Ms. Saunders presents for follow up. Has dilator and will see Dr. Leon in 6 months. Denies bleeding or pain. Sexually active without complaints.   HCM: Mammogram 8/2022 - right breast calcs - re-image with annual mammogram in 2/2023

## 2023-09-25 ENCOUNTER — RX RENEWAL (OUTPATIENT)
Age: 63
End: 2023-09-25

## 2023-11-01 ENCOUNTER — RX RENEWAL (OUTPATIENT)
Age: 63
End: 2023-11-01

## 2023-11-10 NOTE — ASU PATIENT PROFILE, ADULT - HEALTH/HEALTHCARE ANXIETIES, PROFILE
11/10/2023        Matty Reaves     : 2021  [address]  [facility] [place] [address]        To Whom It May Concern,    This is to certify that Matty was seen in the clinic on 11/10/2023. Please excuse Shana Dinh from work  23  through 11/10/23.            Electronically signed on 11/10/2023 at 3:52 PM BY: Monica Shannon MD                                .    [facility]  [facility]  [address] [place] [address] [address]  [place] [address]  [phone]    
none

## 2023-11-20 ENCOUNTER — RX RENEWAL (OUTPATIENT)
Age: 63
End: 2023-11-20

## 2023-12-04 ENCOUNTER — NON-APPOINTMENT (OUTPATIENT)
Age: 63
End: 2023-12-04

## 2023-12-12 ENCOUNTER — APPOINTMENT (OUTPATIENT)
Dept: GYNECOLOGIC ONCOLOGY | Facility: CLINIC | Age: 63
End: 2023-12-12
Payer: COMMERCIAL

## 2023-12-12 VITALS
HEART RATE: 75 BPM | DIASTOLIC BLOOD PRESSURE: 88 MMHG | SYSTOLIC BLOOD PRESSURE: 143 MMHG | WEIGHT: 158.5 LBS | BODY MASS INDEX: 25.58 KG/M2

## 2023-12-12 PROCEDURE — 99213 OFFICE O/P EST LOW 20 MIN: CPT

## 2024-01-09 ENCOUNTER — APPOINTMENT (OUTPATIENT)
Dept: CARDIOLOGY | Facility: CLINIC | Age: 64
End: 2024-01-09
Payer: COMMERCIAL

## 2024-01-09 ENCOUNTER — LABORATORY RESULT (OUTPATIENT)
Age: 64
End: 2024-01-09

## 2024-01-09 ENCOUNTER — NON-APPOINTMENT (OUTPATIENT)
Age: 64
End: 2024-01-09

## 2024-01-09 VITALS
SYSTOLIC BLOOD PRESSURE: 126 MMHG | HEIGHT: 66 IN | DIASTOLIC BLOOD PRESSURE: 80 MMHG | RESPIRATION RATE: 16 BRPM | TEMPERATURE: 97.7 F | HEART RATE: 70 BPM | WEIGHT: 161 LBS | OXYGEN SATURATION: 98 % | BODY MASS INDEX: 25.88 KG/M2

## 2024-01-09 DIAGNOSIS — Z86.79 PERSONAL HISTORY OF OTHER DISEASES OF THE CIRCULATORY SYSTEM: ICD-10-CM

## 2024-01-09 PROCEDURE — 93000 ELECTROCARDIOGRAM COMPLETE: CPT

## 2024-01-09 PROCEDURE — 99214 OFFICE O/P EST MOD 30 MIN: CPT

## 2024-01-23 ENCOUNTER — RX RENEWAL (OUTPATIENT)
Age: 64
End: 2024-01-23

## 2024-03-26 ENCOUNTER — RX RENEWAL (OUTPATIENT)
Age: 64
End: 2024-03-26

## 2024-04-09 PROBLEM — C55 ENDOMETRIOID ADENOCARCINOMA OF UTERUS: Status: ACTIVE | Noted: 2022-03-04

## 2024-04-15 ENCOUNTER — NON-APPOINTMENT (OUTPATIENT)
Age: 64
End: 2024-04-15

## 2024-04-16 ENCOUNTER — APPOINTMENT (OUTPATIENT)
Dept: GYNECOLOGIC ONCOLOGY | Facility: CLINIC | Age: 64
End: 2024-04-16
Payer: COMMERCIAL

## 2024-04-16 VITALS
HEART RATE: 78 BPM | HEIGHT: 66 IN | BODY MASS INDEX: 25.88 KG/M2 | WEIGHT: 161 LBS | DIASTOLIC BLOOD PRESSURE: 82 MMHG | SYSTOLIC BLOOD PRESSURE: 150 MMHG

## 2024-04-16 DIAGNOSIS — C55 MALIGNANT NEOPLASM OF UTERUS, PART UNSPECIFIED: ICD-10-CM

## 2024-04-16 PROCEDURE — 99459 PELVIC EXAMINATION: CPT

## 2024-04-16 PROCEDURE — G2211 COMPLEX E/M VISIT ADD ON: CPT

## 2024-04-16 PROCEDURE — 99213 OFFICE O/P EST LOW 20 MIN: CPT

## 2024-04-16 NOTE — PHYSICAL EXAM
[Chaperone Present] : A chaperone was present in the examining room during all aspects of the physical examination [Absent] : Adnexa(ae): Absent [Normal] : Recto-Vaginal Exam: Normal [Fully active, able to carry on all pre-disease performance without restriction] : Status 0 - Fully active, able to carry on all pre-disease performance without restriction [21358] : A chaperone was present during the pelvic exam. [FreeTextEntry2] : Dameon [de-identified] : Healed incisions

## 2024-04-16 NOTE — HISTORY OF PRESENT ILLNESS
[FreeTextEntry1] : Stage IBG1 endometrial adenocarcinoma, DOI 75% MSI-H with MLH1 promotor methylation R-A TLH, BSO, PPALND, 3/31/22 VBT x 3 from 5/19/22-6/1/22 (2100cGy)  Referred by (GYN) Dr. Kaiser Cardiologist: Dr. Hamm  Rad Onc - Dr. Leon  Ms. Saunders presents for follow up. Has dilator. Denies bleeding or pain. Sexually active without complaints.   HCM:  Mammo and sono 6/2023 stable, benign BIRADS 3.

## 2024-05-29 ENCOUNTER — APPOINTMENT (OUTPATIENT)
Dept: CARDIOLOGY | Facility: CLINIC | Age: 64
End: 2024-05-29
Payer: COMMERCIAL

## 2024-05-29 VITALS
HEART RATE: 70 BPM | DIASTOLIC BLOOD PRESSURE: 72 MMHG | WEIGHT: 158 LBS | BODY MASS INDEX: 25.39 KG/M2 | OXYGEN SATURATION: 98 % | SYSTOLIC BLOOD PRESSURE: 124 MMHG | RESPIRATION RATE: 16 BRPM | HEIGHT: 66 IN | TEMPERATURE: 97.8 F

## 2024-05-29 VITALS — DIASTOLIC BLOOD PRESSURE: 72 MMHG | SYSTOLIC BLOOD PRESSURE: 124 MMHG

## 2024-05-29 DIAGNOSIS — I37.1 NONRHEUMATIC PULMONARY VALVE INSUFFICIENCY: ICD-10-CM

## 2024-05-29 DIAGNOSIS — Z87.898 PERSONAL HISTORY OF OTHER SPECIFIED CONDITIONS: ICD-10-CM

## 2024-05-29 DIAGNOSIS — Z78.9 OTHER SPECIFIED HEALTH STATUS: ICD-10-CM

## 2024-05-29 DIAGNOSIS — I10 ESSENTIAL (PRIMARY) HYPERTENSION: ICD-10-CM

## 2024-05-29 DIAGNOSIS — E78.00 PURE HYPERCHOLESTEROLEMIA, UNSPECIFIED: ICD-10-CM

## 2024-05-29 DIAGNOSIS — I07.1 RHEUMATIC TRICUSPID INSUFFICIENCY: ICD-10-CM

## 2024-05-29 DIAGNOSIS — I34.0 NONRHEUMATIC MITRAL (VALVE) INSUFFICIENCY: ICD-10-CM

## 2024-05-29 DIAGNOSIS — I35.1 NONRHEUMATIC AORTIC (VALVE) INSUFFICIENCY: ICD-10-CM

## 2024-05-29 PROCEDURE — 99214 OFFICE O/P EST MOD 30 MIN: CPT

## 2024-05-29 PROCEDURE — G2211 COMPLEX E/M VISIT ADD ON: CPT

## 2024-05-29 NOTE — DISCUSSION/SUMMARY
[FreeTextEntry1] : Dr. Chase-(PRIOR VISIT and PMH WITH Dr. Chase): This is a 63-year-old female past medical history significant for hypertension, hyperlipidemia, status post hysterectomy for uterine cancer followed by 3 radiation treatments in , hypothyroidism, who comes in for cardiac follow up cardiac evaluation.She denies chest pain, shortness of breath, dizziness or syncope.  The patient has been stable on her current medications. Her cardiac risk factors include positive family support disease her mother had a myocardial infarction and subsequent coronary artery bypass surgery, her father  from myocardial infarction, and a brother recently  within the last 6 months but was diagnosed with atherosclerotic heart disease which was mild. other cardiac risk factors include positive family history, history of smoking (she stopped over 30 years ago), and hypertension Electrocardiogram done 2024 demonstrate normal sinus rhythm rate 70 bpm is otherwise remarkable for poor R wave progression. Patient will continue on her current medications. She is excited her daughter is getting  in the next year. Patient's blood pressure is under good control. Patient will have blood work done today for lipid profile. Lipid panel done 2023 demonstrated cholesterol 208, HDL of 81, triglycerides 195, LDL direct 99 mg/dL, non-HDL cholesterol 127 mg/dL. Electrocardiogram done 2023 demonstrated normal sinus rhythm rate of 65 bpm is otherwise unremarkable. Echo Doppler examination done January 10, 2022 demonstrated mild mitral valve regurgitation, minimal aortic valve regurgitation, mild tricuspid valve regurgitation and minimal pulmonic valve regurgitation with satisfactory left ventricular function ejection fraction 67% Lipid panel done January 10, 2023 demonstrated a cholesterol of 229, HDL of 93, triglycerides 189, LDL calculated 98 mg/dL, LDL direct 111 mg/dL. She will continue on her current dose of Crestor 20 mg daily. She will have follow-up blood work done in 4 months.  Hopefully at that time her LDL cholesterol will be less than 100 mg/dL.  If she has not had a target level at that time consideration can be given to the addition of Zetia to her current medical regimen. She is currently hemodynamically stable and asymptomatic from a cardiac standpoint.  Her blood pressure under excellent control.  Electrocardiogram done 2019 demonstrated normal sinus rhythm rate 74 beats per minutes otherwise unremarkable.  Echo Doppler examination from 2018 which demonstrated minimal valvular regurgitation with satisfactory left ventricular function. She will followup with me in 4 months.  The patient understands that aerobic exercises must be increased to 40 minutes 4 times per week. A detailed discussion of lifestyle modification was done today. The patient has a good understanding of the diagnosis, and treatment plan. Lifestyle modification was also outlined.  PMH:  2018-  when after a vigorous workout with her , she developed a rapid heart rate, didn't feel well, felt particularly hungry. She went to get something to eat as she left the gym, and then sat in the car and felt that she was diaphoretic and shaky. She drank some Gatorade without improvement. She noted that her heart rate was running between  beats per minute on her watch. The patient had completed a vigorous workout on the elliptical  and heart rate went up to 160 beats per minute at that time. She then went to an urgent care center who did not do an electrocardiogram at that time but center to [facility] emergency room. Her blood pressure was noted to be 170/100+, and she was sent out for outpatient followup evaluation. The patient also gets occasional dyspnea on exertion.She was placed on Norvasc 5 mg a day by her primary care physician.  The patient had a normal exercise stress test 2018.   Lifestyle modification including salt restriction was reinforced. The patient does exercise on regular basis. She is instructed to followup with her primary care physician. She'll followup with me in one month. I instructed her to increase her hydration during her aerobic workouts.  Her heart rate was most likely secondary to the effects of hypertension. She will followup with me in one month.

## 2024-05-29 NOTE — CARDIOLOGY SUMMARY
[___] : [unfilled] [No Ischemia] : no Ischemia [LVEF ___%] : LVEF [unfilled]% [de-identified] : 7/12/2021 [de-identified] : 11/13/2018 [de-identified] : 1/13/2018

## 2024-05-29 NOTE — PHYSICAL EXAM
[Well Developed] : well developed [Well Nourished] : well nourished [No Acute Distress] : no acute distress [Normal Venous Pressure] : normal venous pressure [No Carotid Bruit] : no carotid bruit [Normal S1, S2] : normal S1, S2 [No Rub] : no rub [Normal Rate] : normal [Clear Lung Fields] : clear lung fields [Good Air Entry] : good air entry [No Respiratory Distress] : no respiratory distress  [Soft] : abdomen soft [Non Tender] : non-tender [No Masses/organomegaly] : no masses/organomegaly [Normal Bowel Sounds] : normal bowel sounds [Normal Gait] : normal gait [No Edema] : no edema [No Cyanosis] : no cyanosis [No Clubbing] : no clubbing [No Varicosities] : no varicosities [No Rash] : no rash [No Skin Lesions] : no skin lesions [Moves all extremities] : moves all extremities [No Focal Deficits] : no focal deficits [Normal Speech] : normal speech [Alert and Oriented] : alert and oriented [Normal memory] : normal memory [General Appearance - Well Developed] : well developed [Normal Appearance] : normal appearance [Well Groomed] : well groomed [General Appearance - Well Nourished] : well nourished [No Deformities] : no deformities [General Appearance - In No Acute Distress] : no acute distress [Normal Conjunctiva] : the conjunctiva exhibited no abnormalities [Normal Oral Mucosa] : normal oral mucosa [Normal Jugular Venous A Waves Present] : normal jugular venous A waves present [Normal Jugular Venous V Waves Present] : normal jugular venous V waves present [No Jugular Venous Estrada A Waves] : no jugular venous estrada A waves [Respiration, Rhythm And Depth] : normal respiratory rhythm and effort [Exaggerated Use Of Accessory Muscles For Inspiration] : no accessory muscle use [Bowel Sounds] : normal bowel sounds [Abdomen Soft] : soft [Abnormal Walk] : normal gait [Gait - Sufficient For Exercise Testing] : the gait was sufficient for exercise testing [Nail Clubbing] : no clubbing of the fingernails [Cyanosis, Localized] : no localized cyanosis [Skin Color & Pigmentation] : normal skin color and pigmentation [] : no rash [Skin Turgor] : normal skin turgor [Oriented To Time, Place, And Person] : oriented to person, place, and time [Affect] : the affect was normal [Mood] : the mood was normal [No Anxiety] : not feeling anxious [5th Left ICS - MCL] : palpated at the 5th LICS in the midclavicular line [Normal] : normal [No Precordial Heave] : no precordial heave was noted [Bradycardia] : bradycardic [Heart Rate ___] : [unfilled] bpm [Rhythm Regular] : regular [Normal S1] : normal S1 [Normal S2] : normal S2 [No Gallop] : no gallop heard [I] : a grade 1 [2+] : left 2+ [No Abnormalities] : the abdominal aorta was not enlarged and no bruit was heard [No Pitting Edema] : no pitting edema present [Right Femoral Bruit] : no bruit heard over the right femoral artery [Left Femoral Bruit] : no bruit heard over the left femoral artery [FreeTextEntry1] : coarse breath sounds bilaterally [S3] : no S3 [S4] : no S4 [Click] : no click [Pericardial Rub] : no pericardial rub [Right Carotid Bruit] : no bruit heard over the right carotid [Left Carotid Bruit] : no bruit heard over the left carotid

## 2024-05-29 NOTE — REASON FOR VISIT
[CV Risk Factors and Non-Cardiac Disease] : CV risk factors and non-cardiac disease [Hyperlipidemia] : hyperlipidemia [Follow-Up - Clinic] : a clinic follow-up of [Hypertension] : hypertension [Palpitations] : palpitations [FreeTextEntry3] : Dr. Ariana Buitrago [FreeTextEntry1] : murmur

## 2024-05-29 NOTE — ASSESSMENT
[FreeTextEntry1] : This is a 63-year-old female here today for follow-up cardiac evaluation.  She has a past medical history significant for hypertension, hyperlipidemia, status post hysterectomy for uterine cancer followed by 3 radiation treatments in , hypothyroidism,  Cardiac risk factors include positive family support disease her mother had a myocardial infarction and subsequent coronary artery bypass surgery, her father  from myocardial infarction, and a brother recently  within the last 6 months but was diagnosed with atherosclerotic heart disease which was mild. other cardiac risk factors include positive family history, history of smoking (she stopped over 30 years ago), and hypertension.   HPI: She is feeling generally well today and denies chest pain, dizziness, heart palpitations, recent episodes of syncope or falls, SOB, or dyspnea at this time. She may have occasional dyspnea on exertion when climbing stairs.   Current Medications: Telmisartan 40mg PO DAILY and Rosuvastatin 20mg PO DAILY.   She is a retired ID nurse. She enjoys golfing.    BLOOD PRESSURE: -BP is well controlled in today's visit   -I have discussed the importance of maintaining good BP control and reviewed the newest guidelines with the patient while re-enforcing dietary sodium restrictions to no more than 2-3 g daily, DASH diet, life style modifications as well as the goal of maintaining ideal body weight with the patient today. I have advised the patient to avoid the use of over-the-counter medications/ supplements especially NSAIDS.   BLOOD WORK: -Lipid panel done 2024 demonstrated triglyceride 199, HDL 77 LDL 89, non-, LDL direct 102. -Lipid panel done 2023 demonstrated cholesterol 208, HDL of 81, triglycerides 195, LDL direct 99 mg/dL, non-HDL cholesterol 127 mg/dL.    TESTING/REPORTS: -Electrocardiogram done 2024 demonstrate normal sinus rhythm rate 70 bpm is otherwise remarkable for poor R wave progression.  -Electrocardiogram done 2023 demonstrated normal sinus rhythm rate of 65 bpm is otherwise unremarkable.  -Echo Doppler examination done January 10, 2022 demonstrated mild mitral valve regurgitation, minimal aortic valve regurgitation, mild tricuspid valve regurgitation and minimal pulmonic valve regurgitation with satisfactory left ventricular function ejection fraction 67%  -EKG done 2021 which demonstrated regular sinus rhythm with nonspecific ST-T wave changes, BPM of 61  -Echo Doppler examination from 2018 which demonstrated minimal valvular regurgitation with satisfactory left ventricular function. She will followup with me in 4 months.  -The patient had a normal exercise stress test 2018.    PLAN: -She will continue with her usual medications and will contact the office if she is having any complaints between now and their next follow up appointment. -Patient will schedule echocardiogram doppler examination to evaluate murmur, left ventricular function, chamber size, and rule out hypertrophy. -She will schedule an exercise stress test to evaluate for significant coronary artery disease.   I have discussed the plan of care with CASTILLO FLOYD and she will follow up in 4-6 months. They are compliant with all of their medications.     The patient understands that aerobic exercises must be increased to 40 minutes 4 times/week and a detailed discussion of lifestyle modification was done today.   The patient has a good understanding of the diagnosis, treatment plan and lifestyle modification.   They will contact me at the office for any questions with their care or any changes in their health status.   The patient was discussed with supervision physician Dr. Max Chase at the time of the visit and the plan of care will be carried out as noted above.     TREVIN Verma NP

## 2024-06-13 RX ORDER — ROSUVASTATIN CALCIUM 20 MG/1
20 TABLET, FILM COATED ORAL
Qty: 90 | Refills: 1 | Status: ACTIVE | COMMUNITY
Start: 2018-11-14 | End: 1900-01-01

## 2024-06-17 ENCOUNTER — RX RENEWAL (OUTPATIENT)
Age: 64
End: 2024-06-17

## 2024-07-02 ENCOUNTER — RX RENEWAL (OUTPATIENT)
Age: 64
End: 2024-07-02

## 2024-07-09 ENCOUNTER — OUTPATIENT (OUTPATIENT)
Dept: OUTPATIENT SERVICES | Facility: HOSPITAL | Age: 64
LOS: 1 days | End: 2024-07-09
Payer: COMMERCIAL

## 2024-07-09 ENCOUNTER — APPOINTMENT (OUTPATIENT)
Dept: RADIOLOGY | Facility: IMAGING CENTER | Age: 64
End: 2024-07-09
Payer: COMMERCIAL

## 2024-07-09 ENCOUNTER — APPOINTMENT (OUTPATIENT)
Dept: ULTRASOUND IMAGING | Facility: IMAGING CENTER | Age: 64
End: 2024-07-09
Payer: COMMERCIAL

## 2024-07-09 ENCOUNTER — APPOINTMENT (OUTPATIENT)
Dept: MAMMOGRAPHY | Facility: IMAGING CENTER | Age: 64
End: 2024-07-09
Payer: COMMERCIAL

## 2024-07-09 DIAGNOSIS — Z12.31 ENCOUNTER FOR SCREENING MAMMOGRAM FOR MALIGNANT NEOPLASM OF BREAST: ICD-10-CM

## 2024-07-09 DIAGNOSIS — H26.9 UNSPECIFIED CATARACT: Chronic | ICD-10-CM

## 2024-07-09 DIAGNOSIS — Z98.890 OTHER SPECIFIED POSTPROCEDURAL STATES: Chronic | ICD-10-CM

## 2024-07-09 PROCEDURE — 77080 DXA BONE DENSITY AXIAL: CPT | Mod: 26

## 2024-07-09 PROCEDURE — 77066 DX MAMMO INCL CAD BI: CPT | Mod: 26

## 2024-07-09 PROCEDURE — 76641 ULTRASOUND BREAST COMPLETE: CPT | Mod: 26,50

## 2024-07-09 PROCEDURE — G0279: CPT | Mod: 26

## 2024-07-09 PROCEDURE — 77066 DX MAMMO INCL CAD BI: CPT

## 2024-07-09 PROCEDURE — G0279: CPT

## 2024-07-09 PROCEDURE — 76641 ULTRASOUND BREAST COMPLETE: CPT

## 2024-07-09 PROCEDURE — 77080 DXA BONE DENSITY AXIAL: CPT

## 2024-10-15 ENCOUNTER — NON-APPOINTMENT (OUTPATIENT)
Age: 64
End: 2024-10-15

## 2024-10-15 ENCOUNTER — APPOINTMENT (OUTPATIENT)
Dept: GYNECOLOGIC ONCOLOGY | Facility: CLINIC | Age: 64
End: 2024-10-15
Payer: COMMERCIAL

## 2024-10-15 VITALS
HEART RATE: 84 BPM | WEIGHT: 162 LBS | SYSTOLIC BLOOD PRESSURE: 124 MMHG | DIASTOLIC BLOOD PRESSURE: 82 MMHG | BODY MASS INDEX: 26.03 KG/M2 | HEIGHT: 66 IN | OXYGEN SATURATION: 96 %

## 2024-10-15 DIAGNOSIS — C55 MALIGNANT NEOPLASM OF UTERUS, PART UNSPECIFIED: ICD-10-CM

## 2024-10-15 DIAGNOSIS — Z92.3 PERSONAL HISTORY OF IRRADIATION: ICD-10-CM

## 2024-10-15 PROCEDURE — G2211 COMPLEX E/M VISIT ADD ON: CPT

## 2024-10-15 PROCEDURE — 99213 OFFICE O/P EST LOW 20 MIN: CPT

## 2024-10-15 PROCEDURE — 99459 PELVIC EXAMINATION: CPT

## 2024-12-10 ENCOUNTER — RX RENEWAL (OUTPATIENT)
Age: 64
End: 2024-12-10

## 2025-01-29 ENCOUNTER — NON-APPOINTMENT (OUTPATIENT)
Age: 65
End: 2025-01-29

## 2025-01-29 ENCOUNTER — APPOINTMENT (OUTPATIENT)
Dept: CARDIOLOGY | Facility: CLINIC | Age: 65
End: 2025-01-29
Payer: COMMERCIAL

## 2025-01-29 VITALS
HEIGHT: 66 IN | HEART RATE: 70 BPM | OXYGEN SATURATION: 99 % | BODY MASS INDEX: 24.91 KG/M2 | TEMPERATURE: 98.2 F | RESPIRATION RATE: 16 BRPM | WEIGHT: 155 LBS | SYSTOLIC BLOOD PRESSURE: 126 MMHG | DIASTOLIC BLOOD PRESSURE: 78 MMHG

## 2025-01-29 DIAGNOSIS — I10 ESSENTIAL (PRIMARY) HYPERTENSION: ICD-10-CM

## 2025-01-29 DIAGNOSIS — E78.00 PURE HYPERCHOLESTEROLEMIA, UNSPECIFIED: ICD-10-CM

## 2025-01-29 DIAGNOSIS — Z78.9 OTHER SPECIFIED HEALTH STATUS: ICD-10-CM

## 2025-01-29 DIAGNOSIS — I34.0 NONRHEUMATIC MITRAL (VALVE) INSUFFICIENCY: ICD-10-CM

## 2025-01-29 DIAGNOSIS — Z87.898 PERSONAL HISTORY OF OTHER SPECIFIED CONDITIONS: ICD-10-CM

## 2025-01-29 PROCEDURE — G2211 COMPLEX E/M VISIT ADD ON: CPT

## 2025-01-29 PROCEDURE — 99214 OFFICE O/P EST MOD 30 MIN: CPT

## 2025-01-29 PROCEDURE — 93000 ELECTROCARDIOGRAM COMPLETE: CPT

## 2025-04-22 ENCOUNTER — APPOINTMENT (OUTPATIENT)
Dept: GYNECOLOGIC ONCOLOGY | Facility: CLINIC | Age: 65
End: 2025-04-22
Payer: COMMERCIAL

## 2025-04-22 VITALS
HEART RATE: 76 BPM | BODY MASS INDEX: 25.5 KG/M2 | TEMPERATURE: 97.3 F | SYSTOLIC BLOOD PRESSURE: 136 MMHG | RESPIRATION RATE: 17 BRPM | WEIGHT: 158 LBS | OXYGEN SATURATION: 99 % | DIASTOLIC BLOOD PRESSURE: 76 MMHG

## 2025-04-22 DIAGNOSIS — Z92.3 PERSONAL HISTORY OF IRRADIATION: ICD-10-CM

## 2025-04-22 DIAGNOSIS — C55 MALIGNANT NEOPLASM OF UTERUS, PART UNSPECIFIED: ICD-10-CM

## 2025-04-22 PROCEDURE — 99459 PELVIC EXAMINATION: CPT

## 2025-04-22 PROCEDURE — G2211 COMPLEX E/M VISIT ADD ON: CPT

## 2025-04-22 PROCEDURE — 99213 OFFICE O/P EST LOW 20 MIN: CPT

## 2025-05-17 ENCOUNTER — NON-APPOINTMENT (OUTPATIENT)
Age: 65
End: 2025-05-17

## 2025-05-21 ENCOUNTER — NON-APPOINTMENT (OUTPATIENT)
Age: 65
End: 2025-05-21

## 2025-05-21 ENCOUNTER — APPOINTMENT (OUTPATIENT)
Dept: DERMATOLOGY | Facility: CLINIC | Age: 65
End: 2025-05-21
Payer: COMMERCIAL

## 2025-05-21 VITALS — HEIGHT: 66 IN | BODY MASS INDEX: 25.55 KG/M2 | WEIGHT: 159 LBS

## 2025-05-21 DIAGNOSIS — R23.8 OTHER SKIN CHANGES: ICD-10-CM

## 2025-05-21 DIAGNOSIS — D22.9 MELANOCYTIC NEVI, UNSPECIFIED: ICD-10-CM

## 2025-05-21 DIAGNOSIS — L81.4 OTHER MELANIN HYPERPIGMENTATION: ICD-10-CM

## 2025-05-21 DIAGNOSIS — Z12.83 ENCOUNTER FOR SCREENING FOR MALIGNANT NEOPLASM OF SKIN: ICD-10-CM

## 2025-05-21 PROCEDURE — 99203 OFFICE O/P NEW LOW 30 MIN: CPT

## 2025-05-28 ENCOUNTER — RX RENEWAL (OUTPATIENT)
Age: 65
End: 2025-05-28

## 2025-07-16 ENCOUNTER — OUTPATIENT (OUTPATIENT)
Dept: OUTPATIENT SERVICES | Facility: HOSPITAL | Age: 65
LOS: 1 days | End: 2025-07-16
Payer: COMMERCIAL

## 2025-07-16 ENCOUNTER — APPOINTMENT (OUTPATIENT)
Dept: ULTRASOUND IMAGING | Facility: IMAGING CENTER | Age: 65
End: 2025-07-16
Payer: COMMERCIAL

## 2025-07-16 ENCOUNTER — APPOINTMENT (OUTPATIENT)
Dept: MAMMOGRAPHY | Facility: IMAGING CENTER | Age: 65
End: 2025-07-16
Payer: COMMERCIAL

## 2025-07-16 DIAGNOSIS — H26.9 UNSPECIFIED CATARACT: Chronic | ICD-10-CM

## 2025-07-16 DIAGNOSIS — Z12.31 ENCOUNTER FOR SCREENING MAMMOGRAM FOR MALIGNANT NEOPLASM OF BREAST: ICD-10-CM

## 2025-07-16 DIAGNOSIS — Z00.8 ENCOUNTER FOR OTHER GENERAL EXAMINATION: ICD-10-CM

## 2025-07-16 DIAGNOSIS — Z98.890 OTHER SPECIFIED POSTPROCEDURAL STATES: Chronic | ICD-10-CM

## 2025-07-16 DIAGNOSIS — R92.30 DENSE BREASTS, UNSPECIFIED: ICD-10-CM

## 2025-07-16 PROCEDURE — 76641 ULTRASOUND BREAST COMPLETE: CPT

## 2025-07-16 PROCEDURE — 77067 SCR MAMMO BI INCL CAD: CPT

## 2025-07-16 PROCEDURE — 77067 SCR MAMMO BI INCL CAD: CPT | Mod: 26

## 2025-07-16 PROCEDURE — 76641 ULTRASOUND BREAST COMPLETE: CPT | Mod: 26,50

## 2025-07-16 PROCEDURE — 77063 BREAST TOMOSYNTHESIS BI: CPT | Mod: 26

## 2025-07-16 PROCEDURE — 77063 BREAST TOMOSYNTHESIS BI: CPT

## (undated) DEVICE — BASIN SET DOUBLE

## (undated) DEVICE — SOL IRR POUR H2O 250ML

## (undated) DEVICE — XI ARM FORCEP FENESTRATED BIPOLAR 8MM

## (undated) DEVICE — XI ARM SCISSOR MONO CURVED

## (undated) DEVICE — SUT POLYSORB 0 30" GS-23

## (undated) DEVICE — FOLEY TRAY 16FR 5CC LF UMETER CLOSED

## (undated) DEVICE — UTERINE MANIPULATOR CONMED VCARE MED 34MM

## (undated) DEVICE — DRSG MASTISOL

## (undated) DEVICE — SPECIMEN CONTAINER 100ML

## (undated) DEVICE — DRAPE INSTRUMENT POUCH 6.75" X 11"

## (undated) DEVICE — ENDOCATCH 10MM SPECIMEN POUCH

## (undated) DEVICE — XI ARM DISSECTOR CURVED BIPOLAR 8MM

## (undated) DEVICE — POSITIONER FOAM HEAD CRADLE (PINK)

## (undated) DEVICE — LUBRICATING JELLY ONESHOT 1.25OZ

## (undated) DEVICE — POSITIONER PURPLE ARM ONE STEP (LARGE)

## (undated) DEVICE — XI ARM GRASPER TIP UP FENESTRATED

## (undated) DEVICE — XI ARM NEEDLE DRIVER LARGE

## (undated) DEVICE — PREP CHLOROHEXIDINE 4% 118CC KIT

## (undated) DEVICE — XI DRAPE ARM

## (undated) DEVICE — XI SEAL UNIV 5- 8 MM

## (undated) DEVICE — VENODYNE/SCD SLEEVE CALF MEDIUM

## (undated) DEVICE — INZII RETRIEVAL SYSTEM 5MM

## (undated) DEVICE — SUT POLYSORB 4-0 P-12 UNDYED

## (undated) DEVICE — POSITIONER PINK PAD PIGAZZI SYSTEM

## (undated) DEVICE — DRAPE 3/4 SHEET 52X76"

## (undated) DEVICE — UTERINE MANIPULATOR CONMED VCARE SM 32MM

## (undated) DEVICE — XI DRAPE COLUMN

## (undated) DEVICE — GOWN TRIMAX LG

## (undated) DEVICE — XI OBTURATOR OPTICAL BLADELESS 8MM

## (undated) DEVICE — TUBING AIRSEAL TRI-LUMEN FILTERED

## (undated) DEVICE — XI ARM NEEDLE DRIVER SUTURECUT MEGA 8MM

## (undated) DEVICE — XI ARM FORCEP MARYLAND BIPOLAR

## (undated) DEVICE — ELEV UTERINE PROTM W LNG HNDL MEDIUM 35MM

## (undated) DEVICE — STAPLER SKIN VISI-STAT 35 WIDE

## (undated) DEVICE — XI TIP COVER

## (undated) DEVICE — PACK ROBOTIC LIJ

## (undated) DEVICE — DRAPE LARGE SHEET 72X85"

## (undated) DEVICE — GLV 6.5 PROTEXIS W HYDROGEL

## (undated) DEVICE — D HELP - CLEARVIEW CLEARIFY SYSTEM

## (undated) DEVICE — DRSG STERISTRIPS 0.5 X 4"

## (undated) DEVICE — SOL IRR POUR NS 0.9% 500ML

## (undated) DEVICE — XI ARM FORCEP PROGRASP 8MM

## (undated) DEVICE — UTERINE MANIPULATOR CONMED VCARE LG 37MM

## (undated) DEVICE — GOWN XXXL

## (undated) DEVICE — DRSG OPSITE 13.75 X 4"

## (undated) DEVICE — TROCAR SURGIQUEST AIRSEAL 8MMX100MM

## (undated) DEVICE — TUBING STRYKEFLOW II SUCTION / IRRIGATOR

## (undated) DEVICE — WARMING BLANKET UPPER ADULT

## (undated) DEVICE — PACK PERI GYN